# Patient Record
Sex: FEMALE | Race: WHITE | NOT HISPANIC OR LATINO | Employment: UNEMPLOYED | URBAN - METROPOLITAN AREA
[De-identification: names, ages, dates, MRNs, and addresses within clinical notes are randomized per-mention and may not be internally consistent; named-entity substitution may affect disease eponyms.]

---

## 2019-04-03 ENCOUNTER — OFFICE VISIT (OUTPATIENT)
Dept: OBSTETRICS AND GYNECOLOGY | Age: 44
End: 2019-04-03

## 2019-04-03 ENCOUNTER — TELEPHONE (OUTPATIENT)
Dept: OBSTETRICS AND GYNECOLOGY | Age: 44
End: 2019-04-03

## 2019-04-03 ENCOUNTER — PROCEDURE VISIT (OUTPATIENT)
Dept: OBSTETRICS AND GYNECOLOGY | Age: 44
End: 2019-04-03

## 2019-04-03 VITALS
WEIGHT: 191 LBS | SYSTOLIC BLOOD PRESSURE: 108 MMHG | DIASTOLIC BLOOD PRESSURE: 68 MMHG | BODY MASS INDEX: 33.84 KG/M2 | HEIGHT: 63 IN

## 2019-04-03 DIAGNOSIS — Z01.419 WELL FEMALE EXAM WITH ROUTINE GYNECOLOGICAL EXAM: Primary | ICD-10-CM

## 2019-04-03 DIAGNOSIS — E88.81 INSULIN RESISTANCE SYNDROME: ICD-10-CM

## 2019-04-03 DIAGNOSIS — Z11.3 SCREEN FOR STD (SEXUALLY TRANSMITTED DISEASE): ICD-10-CM

## 2019-04-03 DIAGNOSIS — Z13.0 SCREENING, ANEMIA, DEFICIENCY, IRON: ICD-10-CM

## 2019-04-03 DIAGNOSIS — E78.5 DYSLIPIDEMIA: ICD-10-CM

## 2019-04-03 DIAGNOSIS — N93.9 ABNORMAL UTERINE BLEEDING (AUB): Primary | ICD-10-CM

## 2019-04-03 DIAGNOSIS — Z13.89 SCREENING FOR BLOOD OR PROTEIN IN URINE: ICD-10-CM

## 2019-04-03 DIAGNOSIS — Z12.4 PAP SMEAR FOR CERVICAL CANCER SCREENING: ICD-10-CM

## 2019-04-03 DIAGNOSIS — K58.1 IRRITABLE BOWEL SYNDROME WITH CONSTIPATION: ICD-10-CM

## 2019-04-03 DIAGNOSIS — N93.9 ABNORMAL UTERINE BLEEDING (AUB): ICD-10-CM

## 2019-04-03 PROBLEM — E88.810 INSULIN RESISTANCE SYNDROME: Status: ACTIVE | Noted: 2019-04-03

## 2019-04-03 PROCEDURE — 76830 TRANSVAGINAL US NON-OB: CPT | Performed by: OBSTETRICS & GYNECOLOGY

## 2019-04-03 PROCEDURE — 99213 OFFICE O/P EST LOW 20 MIN: CPT | Performed by: OBSTETRICS & GYNECOLOGY

## 2019-04-03 PROCEDURE — 99386 PREV VISIT NEW AGE 40-64: CPT | Performed by: OBSTETRICS & GYNECOLOGY

## 2019-04-03 RX ORDER — DEXLANSOPRAZOLE 60 MG/1
60 CAPSULE, DELAYED RELEASE ORAL DAILY
COMMUNITY
Start: 2019-03-20 | End: 2020-12-21

## 2019-04-03 RX ORDER — PLECANATIDE 3 MG/1
TABLET ORAL DAILY
COMMUNITY
Start: 2019-03-20 | End: 2019-07-22

## 2019-04-03 RX ORDER — LEVONORGESTREL AND ETHINYL ESTRADIOL 0.1-0.02MG
1 KIT ORAL DAILY
Qty: 28 TABLET | Refills: 12 | Status: SHIPPED | OUTPATIENT
Start: 2019-04-03 | End: 2020-03-09

## 2019-04-03 NOTE — TELEPHONE ENCOUNTER
Received a rejection for patient's Balcoltra.  Called [facility] to attempt PA and was told this was a plan exclusion and given a list of formulary alternatives:     Aubra-Eq; Falmina; Larissia; Orsythia; Aviane; Delyla; Lessina; Levonor; Lutera; Sronyx; Viena.     The prescribed medication is up for review, however, they do not fill it will be approved.

## 2019-04-03 NOTE — PROGRESS NOTES
Routine Annual Visit    4/3/2019    Patient: Nelda Gaspar          MR#:[id number]      History of Present Illness    Chief Complaint   Patient presents with   • Establish Care     New patient.  AUB. Takes Sprintec around the clock and still having cycles.    • Gynecologic Exam     Annual.  Last pap 2 years ago and mammogram (S) approximately 2 years ago, both normal.        43 y.o. female  who presents for annual exam.    Presents for routine annual exam reporting abnormal uterine bleeding for the past 6 months.  She takes her oral contraceptive pills continuously and has done well on the pill previously but in the last 6 months she has had several episodes of prolonged bleeding with the most recent episode lasting approximately 3 weeks last month.  The patient reports no bleeding currently.  The patient also has irritable bowel type symptoms with predominant constipation on multiple medications to treat chronic constipation.  She reports continued intermittent moderate constipation even on the current regimen she is taking.     Patient's last menstrual period was 2019 (approximate).  Obstetric History:  OB History      Para Term  AB Living    0 0 0 0 0 0    SAB TAB Ectopic Molar Multiple Live Births    0 0 0 0 0 0         Menstrual History:     Patient's last menstrual period was 2019 (approximate).       Sexual History:       ________________________________________  Patient Active Problem List   Diagnosis   • Insulin resistance syndrome   • Irritable bowel syndrome with constipation   • Dyslipidemia   • Well female exam with routine gynecological exam   • Abnormal uterine bleeding (AUB)       Past Medical History:   Diagnosis Date   • Anxiety    • Asthma    • DDD (degenerative disc disease), lumbar    • Dyslipidemia    • GERD (gastroesophageal reflux disease)    • IBS (irritable bowel syndrome)    • Multiple allergies    • PCOS (polycystic ovarian syndrome)        Past  Surgical History:   Procedure Laterality Date   • WISDOM TOOTH EXTRACTION         Social History     Tobacco Use   Smoking Status Never Smoker   Smokeless Tobacco Never Used       Family History   Problem Relation Age of Onset   • Diabetes Mother    • Osteoporosis Mother    • Other Mother         hysterectomy in her 40's.  Patient is 74 and still has PMB.    • Heart disease Father         Stent placement   • Diabetes Father    • Other Father         blood clots   • Breast cancer Paternal Grandmother         50's       Prior to Admission medications    Medication Sig Start Date End Date Taking? Authorizing Provider   albuterol sulfate HFA (PROAIR HFA) 108 (90 Base) MCG/ACT inhaler  7/12/14  Yes Grey, Nurse Keira RN   buPROPion SR (WELLBUTRIN SR) 150 MG 12 hr tablet Take 150 mg by mouth 2 (two) times a day.   Yes Grey, Nurse Epic, RN   cyclobenzaprine (FLEXERIL) 10 MG tablet  3/8/19  Yes Grey, Nurse Keira RN   DEXILANT 60 MG capsule Take 60 mg by mouth Daily. 3/20/19  Yes Provider, MD Rosendo   fenofibrate 160 MG tablet Take 160 mg by mouth daily.   Yes Grey, Nurse Keira RN   metFORMIN (GLUCOPHAGE) 500 MG tablet Take 500 mg by mouth 3 (Three) Times a Day With Meals.   Yes Emergency, Nurse Keira RN   NON FORMULARY Steroid injections to hip/back every 6 weeks.  (epidural injections in each hip)   Yes Grey, Nurse Keira RN   norgestimate-ethinyl estradiol (SPRINTEC 28) 0.25-35 MG-MCG per tablet Take 1 tablet by mouth. 9/5/14  Yes Nurse Keira Edmonds RN   pravastatin (PRAVACHOL) 20 MG tablet Take 20 mg by mouth daily.   Yes Grey, Nurse Epic, RN   traZODone (DESYREL) 50 MG tablet Take 50 mg by mouth Every Night.   Yes Emergency, Nurse Keira RN   TRULANCE 3 MG tablet Daily. 3/20/19  Yes Provider, Historical, MD   AMITIZA 24 MCG capsule  2/14/19 4/3/19  Nurse Keira Edmonds RN   amoxicillin-clavulanate (AUGMENTIN) 875-125 MG per tablet Take 1 tablet by mouth 2 (Two) Times a Day. 3/10/19  "4/3/19  Angel Acevedo Jr., DO   citalopram (CeleXA) 20 MG tablet Take 20 mg by mouth daily.  4/3/19  Emergency, Nurse Epic, RN   fluticasone (FLONASE) 50 MCG/ACT nasal spray 2 sprays into each nostril daily. Administer 2 sprays in each nostril for each dose.  4/3/19  Emergency, Nurse Epic, RN   hyoscyamine (LEVBID) 0.375 MG 12 hr tablet  12/30/18 4/3/19  Emergency, Nurse Keira RN   promethazine (PHENERGAN) 25 MG tablet  12/4/18 4/3/19  Emergency, Nurse NEAL Crockett     ________________________________________    Current contraception: OCP (estrogen/progesterone)  History of abnormal Pap smear: no  Family history of uterine or ovarian cancer: no  Family History of colon cancer/colon polyps: no  History of abnormal mammogram: no  History of abnormal lipids: yes - treated    The following portions of the patient's history were reviewed and updated as appropriate: allergies, current medications, past family history, past medical history, past social history, past surgical history and problem list.    Review of Systems    Pertinent items are noted in HPI.     Objective   Physical Exam    /68   Ht 160 cm (63\")   Wt 86.6 kg (191 lb)   LMP 03/03/2019 (Approximate)   Breastfeeding? No   BMI 33.83 kg/m²    BP Readings from Last 3 Encounters:   04/03/19 108/68   03/10/19 99/68   02/14/18 109/79      Wt Readings from Last 3 Encounters:   04/03/19 86.6 kg (191 lb)   03/10/19 83.5 kg (184 lb)   02/14/18 88.5 kg (195 lb)        BMI: Estimated body mass index is 33.83 kg/m² as calculated from the following:    Height as of this encounter: 160 cm (63\").    Weight as of this encounter: 86.6 kg (191 lb).       General: alert, appears stated age and cooperative   Heart: regular rate and rhythm, S1, S2 normal, no murmur, click, rub or gallop   Lungs: clear to auscultation bilaterally   Abdomen: soft, non-tender, without masses, no organomegaly   Breast: inspection negative, no nipple discharge or bleeding, no masses or " nodularity palpable   Vulva: normal and bartholin's, Urethra, Gay's normal   Vagina: normal mucosa, normal discharge   Cervix: no lesions   Uterus: normal size or exam is limited habitus    Adnexa: exam limited by habitus     As part of wellness and prevention, the following topics were discussed with the patient:     []  Nutrition  [x]  Physical activity/regular exercise   []  Healthy weight  []  Injury prevention  []  Smoking cessation  []  Substance misuse/abuse  []  Sexual behavior  []  STD prevention  []  Contaception  []  Dental health  []  Mental health  []  Immunization  [x]  Encouraged SBE        Assessment:    Nelda was seen today for establish care and gynecologic exam.    Diagnoses and all orders for this visit:    Well female exam with routine gynecological exam  -     IgP, Aptima HPV    Screening for blood or protein in urine  -     Cancel: POC Urinalysis Dipstick    Pap smear for cervical cancer screening  -     IgP, Aptima HPV    Insulin resistance syndrome  -     Hemoglobin A1c    Irritable bowel syndrome with constipation  -suggest MOM    Dyslipidemia    Abnormal uterine bleeding (AUB)  -     TSH  -     Luteinizing hormone  -     Follicle stimulating hormone  -     Prolactin  -     T4, free  - GYN US    Screening, anemia, deficiency, iron  -     CBC (No Diff)          Plan:  No Follow-up on file.      Virgilio Orozco MD  4/3/2019 11:38 AM

## 2019-04-04 LAB
ERYTHROCYTE [DISTWIDTH] IN BLOOD BY AUTOMATED COUNT: 13.8 % (ref 12.3–15.4)
FSH SERPL-ACNC: 11 MIU/ML
HBA1C MFR BLD: 5 % (ref 4.8–5.6)
HCT VFR BLD AUTO: 37.5 % (ref 34–46.6)
HGB BLD-MCNC: 11.4 G/DL (ref 12–15.9)
LH SERPL-ACNC: 13.2 MIU/ML
MCH RBC QN AUTO: 27.3 PG (ref 26.6–33)
MCHC RBC AUTO-ENTMCNC: 30.4 G/DL (ref 31.5–35.7)
MCV RBC AUTO: 89.9 FL (ref 79–97)
PLATELET # BLD AUTO: 496 10*3/MM3 (ref 140–450)
PROLACTIN SERPL-MCNC: 8.2 NG/ML (ref 4.8–23.3)
RBC # BLD AUTO: 4.17 10*6/MM3 (ref 3.77–5.28)
T4 FREE SERPL-MCNC: 1.18 NG/DL (ref 0.93–1.7)
TSH SERPL DL<=0.005 MIU/L-ACNC: 2.25 MIU/ML (ref 0.27–4.2)
WBC # BLD AUTO: 5.65 10*3/MM3 (ref 3.4–10.8)

## 2019-04-05 ENCOUNTER — TELEPHONE (OUTPATIENT)
Dept: OBSTETRICS AND GYNECOLOGY | Age: 44
End: 2019-04-05

## 2019-04-05 LAB
C TRACH RRNA SPEC QL NAA+PROBE: NEGATIVE
N GONORRHOEA RRNA SPEC QL NAA+PROBE: NEGATIVE
T VAGINALIS RRNA VAG QL NAA+PROBE: NEGATIVE

## 2019-04-05 NOTE — TELEPHONE ENCOUNTER
----- Message from Virgilio Orozco MD sent at 4/4/2019  7:48 AM EDT -----  Call pt:  Hormonal profile for abnormal bleeding is normal

## 2019-04-06 LAB
CYTOLOGIST CVX/VAG CYTO: NORMAL
CYTOLOGY CVX/VAG DOC THIN PREP: NORMAL
DX ICD CODE: NORMAL
HIV 1 & 2 AB SER-IMP: NORMAL
HPV I/H RISK 4 DNA CVX QL PROBE+SIG AMP: NEGATIVE
OTHER STN SPEC: NORMAL
PATH REPORT.FINAL DX SPEC: NORMAL
STAT OF ADQ CVX/VAG CYTO-IMP: NORMAL

## 2019-04-08 ENCOUNTER — TELEPHONE (OUTPATIENT)
Dept: OBSTETRICS AND GYNECOLOGY | Age: 44
End: 2019-04-08

## 2019-04-08 NOTE — TELEPHONE ENCOUNTER
----- Message from Virgilio Orozco MD sent at 4/7/2019  7:29 AM EDT -----  Call pt:  PAP is negative.

## 2019-05-07 ENCOUNTER — TELEPHONE (OUTPATIENT)
Dept: OBSTETRICS AND GYNECOLOGY | Age: 44
End: 2019-05-07

## 2019-05-07 NOTE — TELEPHONE ENCOUNTER
The patient was in for preventive exam with a problem.    The Abnormal uterine bleeding is an issue outside of the annual that required further testing    It would be highly inappropriate to code it otherwise     You might compare it to dental work where you went for a cleaning and got a root canal.

## 2019-05-07 NOTE — TELEPHONE ENCOUNTER
Patient's  phoned and spoke with Chantal.  Stated they are receiving a bill from [facility] for $23.00 for the labs that were done for AUB. (hormonal profile).   stated if we code as preventative insurance will pay at 100%.  Patient was a new patient and seen for annual visit, however, she had the issue of AUB.  Can we had preventative codes to her bill??

## 2020-03-09 RX ORDER — LEVONORGESTREL AND ETHINYL ESTRADIOL 0.1-0.02MG
KIT ORAL
Qty: 28 TABLET | Refills: 3 | Status: SHIPPED | OUTPATIENT
Start: 2020-03-09 | End: 2020-06-18

## 2020-06-18 RX ORDER — LEVONORGESTREL AND ETHINYL ESTRADIOL 0.1-0.02MG
KIT ORAL
Qty: 28 TABLET | Refills: 2 | Status: SHIPPED | OUTPATIENT
Start: 2020-06-18 | End: 2020-09-22

## 2020-06-25 ENCOUNTER — OFFICE VISIT (OUTPATIENT)
Dept: OBSTETRICS AND GYNECOLOGY | Age: 45
End: 2020-06-25

## 2020-06-25 ENCOUNTER — TELEPHONE (OUTPATIENT)
Dept: OBSTETRICS AND GYNECOLOGY | Age: 45
End: 2020-06-25

## 2020-06-25 VITALS
WEIGHT: 205 LBS | HEIGHT: 63 IN | DIASTOLIC BLOOD PRESSURE: 76 MMHG | SYSTOLIC BLOOD PRESSURE: 120 MMHG | BODY MASS INDEX: 36.32 KG/M2

## 2020-06-25 DIAGNOSIS — Z12.31 SCREENING MAMMOGRAM, ENCOUNTER FOR: ICD-10-CM

## 2020-06-25 DIAGNOSIS — Z13.89 SCREENING FOR BLOOD OR PROTEIN IN URINE: ICD-10-CM

## 2020-06-25 DIAGNOSIS — Z01.419 WELL FEMALE EXAM WITH ROUTINE GYNECOLOGICAL EXAM: Primary | ICD-10-CM

## 2020-06-25 DIAGNOSIS — Z01.419 PAP TEST, AS PART OF ROUTINE GYNECOLOGICAL EXAMINATION: ICD-10-CM

## 2020-06-25 LAB
BILIRUB BLD-MCNC: NEGATIVE MG/DL
CLARITY, POC: CLEAR
COLOR UR: YELLOW
GLUCOSE UR STRIP-MCNC: NEGATIVE MG/DL
KETONES UR QL: NEGATIVE
LEUKOCYTE EST, POC: ABNORMAL
NITRITE UR-MCNC: NEGATIVE MG/ML
PH UR: 5.5 [PH] (ref 5–8)
PROT UR STRIP-MCNC: NEGATIVE MG/DL
RBC # UR STRIP: NEGATIVE /UL
SP GR UR: 1.01 (ref 1–1.03)
UROBILINOGEN UR QL: NORMAL

## 2020-06-25 PROCEDURE — 81002 URINALYSIS NONAUTO W/O SCOPE: CPT | Performed by: OBSTETRICS & GYNECOLOGY

## 2020-06-25 PROCEDURE — 99396 PREV VISIT EST AGE 40-64: CPT | Performed by: OBSTETRICS & GYNECOLOGY

## 2020-06-25 NOTE — PROGRESS NOTES
Routine Annual Visit    2020    Patient: Nelda Gaspar          MR#:[id number]      History of Present Illness    Chief Complaint   Patient presents with   • Gynecologic Exam     Annual.Pap 4/3/19 negative.        45 y.o. female  who presents for annual exam.    The patient presents for routine annual exam feeling well without any complaints    Patient's last menstrual period was 2020.  Obstetric History:  OB History        0    Para   0    Term   0       0    AB   0    Living   0       SAB   0    TAB   0    Ectopic   0    Molar   0    Multiple   0    Live Births   0               Menstrual History:     Patient's last menstrual period was 2020.       Sexual History:       ________________________________________  Patient Active Problem List   Diagnosis   • Insulin resistance syndrome   • Irritable bowel syndrome with constipation   • Dyslipidemia   • Well female exam with routine gynecological exam   • Abnormal uterine bleeding (AUB)       Past Medical History:   Diagnosis Date   • Anxiety    • Asthma    • DDD (degenerative disc disease), lumbar    • Dyslipidemia    • GERD (gastroesophageal reflux disease)    • IBS (irritable bowel syndrome)    • Multiple allergies    • PCOS (polycystic ovarian syndrome)        Past Surgical History:   Procedure Laterality Date   • WISDOM TOOTH EXTRACTION         Social History     Tobacco Use   Smoking Status Never Smoker   Smokeless Tobacco Never Used       Family History   Problem Relation Age of Onset   • Diabetes Mother    • Osteoporosis Mother    • Other Mother         hysterectomy in her 40's.  Patient is 74 and still has PMB.    • Heart disease Father         Stent placement   • Diabetes Father    • Other Father         blood clots   • Breast cancer Paternal Grandmother         50's       Prior to Admission medications    Medication Sig Start Date End Date Taking? Authorizing Provider   albuterol sulfate HFA (PROAIR HFA) 108 (90  "Base) MCG/ACT inhaler  7/12/14  Yes Emergency, Nurse NEAL Crockett   buPROPion SR (WELLBUTRIN SR) 150 MG 12 hr tablet Take 150 mg by mouth 2 (two) times a day.   Yes Emergency, Nurse Epic, RN   cyclobenzaprine (FLEXERIL) 10 MG tablet  3/8/19  Yes Emergency, Nurse NEAL Crockett   DEXILANT 60 MG capsule Take 60 mg by mouth Daily. 3/20/19  Yes Provider, MD Rosendo   fenofibrate 160 MG tablet Take 160 mg by mouth daily.   Yes Emergency, Nurse NEAL Crockett   metFORMIN (GLUCOPHAGE) 500 MG tablet Take 500 mg by mouth 3 (Three) Times a Day With Meals.   Yes Emergency, Nurse NEAL Crockett   NON FORMULARY Steroid injections to hip/back every 6 weeks.  (epidural injections in each hip)   Yes Emergency, Nurse NEAL Crockett   pravastatin (PRAVACHOL) 20 MG tablet Take 20 mg by mouth daily.   Yes Emergency, Nurse Epic, RN   traZODone (DESYREL) 50 MG tablet Take 50 mg by mouth Every Night.   Yes Emergency, Nurse NEAL Crockett   VIENVA 0.1-20 MG-MCG per tablet TAKE ONE TABLET BY MOUTH DAILY 6/18/20  Yes Virgilio Orozco MD   loratadine (CLARITIN) 10 MG tablet Take 1 tablet by mouth Daily. 7/22/19 6/25/20  Christi Dodge PA   sulfamethoxazole-trimethoprim (BACTRIM DS,SEPTRA DS) 800-160 MG per tablet Take 1 tablet by mouth 2 (Two) Times a Day. 7/22/19 6/25/20  Christi Dodge PA     ________________________________________    Current contraception: OCP (estrogen/progesterone)  History of abnormal Pap smear: no  Family history of uterine or ovarian cancer: no  Family History of colon cancer/colon polyps: no  History of abnormal mammogram: no  History of abnormal lipids: no    The following portions of the patient's history were reviewed and updated as appropriate: allergies, current medications, past family history, past medical history, past social history, past surgical history and problem list.    Review of Systems    Pertinent items are noted in HPI.     Objective   Physical Exam    /76   Ht 160 cm (63\")   Wt 93 kg (205 lb)   LMP 05/01/2020   BMI " "36.31 kg/m²    BP Readings from Last 3 Encounters:   06/25/20 120/76   07/22/19 127/86   04/03/19 108/68      Wt Readings from Last 3 Encounters:   06/25/20 93 kg (205 lb)   07/22/19 87.5 kg (193 lb)   04/03/19 86.6 kg (191 lb)        BMI: Estimated body mass index is 36.31 kg/m² as calculated from the following:    Height as of this encounter: 160 cm (63\").    Weight as of this encounter: 93 kg (205 lb).       General: alert, appears stated age and cooperative   Heart: regular rate and rhythm, S1, S2 normal, no murmur, click, rub or gallop   Lungs: clear to auscultation bilaterally   Abdomen: soft, non-tender, without masses, no organomegaly   Breast: inspection negative, no nipple discharge or bleeding, no masses or nodularity palpable   Vulva: normal and bartholin's, Urethra, [place]'s normal   Vagina: normal mucosa, normal discharge   Cervix: no lesions   Uterus: normal size or exam is limited habitus    Adnexa: exam limited by habitus     As part of wellness and prevention, the following topics were discussed with the patient:  Encouraged self breast exam  Physical activity and regular exercised encouraged.     Assessment:    Nelda was seen today for gynecologic exam.    Diagnoses and all orders for this visit:    Well female exam with routine gynecological exam  -     IgP, Aptima HPV    Screening for blood or protein in urine  -     POC Urinalysis Dipstick    Screening mammogram, encounter for  -     Mammo Screening Bilateral With CAD; Future    Pap test, as part of routine gynecological examination  -     IgP, Aptima HPV      Previous mammogram report completed at [facility] requested      Plan:  Return in about 1 year (around 6/25/2021) for Annual GYN exam.      Virgilio Orozco MD  6/25/2020 15:39  "

## 2020-07-03 LAB
CYTOLOGIST CVX/VAG CYTO: NORMAL
CYTOLOGY CVX/VAG DOC CYTO: NORMAL
CYTOLOGY CVX/VAG DOC THIN PREP: NORMAL
DX ICD CODE: NORMAL
HIV 1 & 2 AB SER-IMP: NORMAL
HPV I/H RISK 4 DNA CVX QL PROBE+SIG AMP: NEGATIVE
OTHER STN SPEC: NORMAL
STAT OF ADQ CVX/VAG CYTO-IMP: NORMAL

## 2020-07-06 ENCOUNTER — TELEPHONE (OUTPATIENT)
Dept: OBSTETRICS AND GYNECOLOGY | Age: 45
End: 2020-07-06

## 2020-07-06 NOTE — TELEPHONE ENCOUNTER
----- Message from Virgilio Orozco MD sent at 7/3/2020 12:37 PM EDT -----  Call pt:  PAP is negative.

## 2020-07-27 ENCOUNTER — HOSPITAL ENCOUNTER (OUTPATIENT)
Dept: MAMMOGRAPHY | Facility: HOSPITAL | Age: 45
Discharge: HOME | End: 2020-07-27
Admitting: OBSTETRICS & GYNECOLOGY

## 2020-07-27 DIAGNOSIS — Z12.31 SCREENING MAMMOGRAM, ENCOUNTER FOR: ICD-10-CM

## 2020-07-27 PROCEDURE — 77067 SCR MAMMO BI INCL CAD: CPT

## 2020-07-28 ENCOUNTER — TELEPHONE (OUTPATIENT)
Dept: OBSTETRICS AND GYNECOLOGY | Age: 45
End: 2020-07-28

## 2020-07-28 NOTE — TELEPHONE ENCOUNTER
----- Message from Virgilio Orozco MD sent at 7/27/2020  6:15 PM EDT -----  Call patient: Normal mammogram

## 2020-09-22 RX ORDER — LEVONORGESTREL AND ETHINYL ESTRADIOL 0.1-0.02MG
KIT ORAL
Qty: 90 TABLET | Refills: 3 | Status: SHIPPED | OUTPATIENT
Start: 2020-09-22 | End: 2020-12-21

## 2021-03-23 ENCOUNTER — BULK ORDERING (OUTPATIENT)
Dept: CASE MANAGEMENT | Facility: OTHER | Age: 46
End: 2021-03-23

## 2021-03-23 DIAGNOSIS — Z23 IMMUNIZATION DUE: ICD-10-CM

## 2021-12-29 ENCOUNTER — OFFICE VISIT (OUTPATIENT)
Dept: OBSTETRICS AND GYNECOLOGY | Age: 46
End: 2021-12-29

## 2021-12-29 VITALS
DIASTOLIC BLOOD PRESSURE: 70 MMHG | SYSTOLIC BLOOD PRESSURE: 110 MMHG | HEIGHT: 63 IN | WEIGHT: 200 LBS | BODY MASS INDEX: 35.44 KG/M2

## 2021-12-29 DIAGNOSIS — Z13.89 SCREENING FOR BLOOD OR PROTEIN IN URINE: ICD-10-CM

## 2021-12-29 DIAGNOSIS — Z12.4 SCREENING FOR MALIGNANT NEOPLASM OF CERVIX: ICD-10-CM

## 2021-12-29 DIAGNOSIS — Z12.31 SCREENING MAMMOGRAM, ENCOUNTER FOR: ICD-10-CM

## 2021-12-29 DIAGNOSIS — N95.1 MENOPAUSAL SYMPTOMS: ICD-10-CM

## 2021-12-29 DIAGNOSIS — Z01.419 WELL FEMALE EXAM WITH ROUTINE GYNECOLOGICAL EXAM: Primary | ICD-10-CM

## 2021-12-29 DIAGNOSIS — Z11.51 SCREENING FOR HUMAN PAPILLOMAVIRUS (HPV): ICD-10-CM

## 2021-12-29 PROBLEM — K29.70 GASTRITIS: Status: RESOLVED | Noted: 2018-10-03 | Resolved: 2021-12-29

## 2021-12-29 PROBLEM — K29.70 GASTRITIS: Status: ACTIVE | Noted: 2018-10-03

## 2021-12-29 PROBLEM — E88.81 METABOLIC SYNDROME X: Status: ACTIVE | Noted: 2019-04-03

## 2021-12-29 PROBLEM — M50.30 DDD (DEGENERATIVE DISC DISEASE), CERVICAL: Status: ACTIVE | Noted: 2018-06-13

## 2021-12-29 PROBLEM — K22.70 BARRETT'S ESOPHAGUS: Status: ACTIVE | Noted: 2018-10-03

## 2021-12-29 PROBLEM — E88.810 METABOLIC SYNDROME X: Status: ACTIVE | Noted: 2019-04-03

## 2021-12-29 PROBLEM — E78.00 HYPERCHOLESTEROLEMIA: Status: ACTIVE | Noted: 2021-01-25

## 2021-12-29 LAB
BILIRUB BLD-MCNC: NEGATIVE MG/DL
CLARITY, POC: CLEAR
COLOR UR: YELLOW
GLUCOSE UR STRIP-MCNC: NEGATIVE MG/DL
KETONES UR QL: NEGATIVE
LEUKOCYTE EST, POC: NEGATIVE
NITRITE UR-MCNC: NEGATIVE MG/ML
PH UR: 6 [PH] (ref 5–8)
PROT UR STRIP-MCNC: NEGATIVE MG/DL
RBC # UR STRIP: NEGATIVE /UL
SP GR UR: 1.01 (ref 1–1.03)
UROBILINOGEN UR QL: NORMAL

## 2021-12-29 PROCEDURE — 81002 URINALYSIS NONAUTO W/O SCOPE: CPT | Performed by: OBSTETRICS & GYNECOLOGY

## 2021-12-29 PROCEDURE — 99396 PREV VISIT EST AGE 40-64: CPT | Performed by: OBSTETRICS & GYNECOLOGY

## 2021-12-29 PROCEDURE — 99212 OFFICE O/P EST SF 10 MIN: CPT | Performed by: OBSTETRICS & GYNECOLOGY

## 2021-12-29 RX ORDER — POLYETHYLENE GLYCOL 3350 17 G/17G
17 POWDER, FOR SOLUTION ORAL DAILY
COMMUNITY

## 2021-12-29 RX ORDER — PHENOL 1.4 %
AEROSOL, SPRAY (ML) MUCOUS MEMBRANE
COMMUNITY

## 2021-12-29 RX ORDER — SENNA PLUS 8.6 MG/1
1 TABLET ORAL DAILY
COMMUNITY

## 2021-12-29 RX ORDER — FAMOTIDINE 20 MG/1
TABLET, FILM COATED ORAL
COMMUNITY

## 2021-12-29 RX ORDER — LIDOCAINE 36 MG/1
PATCH TOPICAL
COMMUNITY

## 2021-12-29 RX ORDER — LUBIPROSTONE 24 UG/1
24 CAPSULE ORAL
COMMUNITY
Start: 2021-11-29 | End: 2022-11-29

## 2021-12-29 NOTE — PROGRESS NOTES
[facility]   Obstetrics and Gynecology       2021    Patient: Nelda Gaspar          MR#:[id number]    History of Present Illness    Chief Complaint   Patient presents with   • Gynecologic Exam     last pap 2020 neg, last mg 2020   • Menopause     pt believes shemay be starting menopause due to bad night swwetas. She reports starting Estroven OTC and it has helped       46 y.o. female  who presents for annual exam.    The patient presents for her regular exam feeling well without complaints.  Problems with constipation are markedly improved by her current regimen.  The patient reports regular periods that are exceptionally short but increased symptoms of hot flashes and night sweats.        Studies reviewed:    Patient's last menstrual period was 2021 (within days).  Obstetric History:  OB History        0    Para   0    Term   0       0    AB   0    Living   0       SAB   0    IAB   0    Ectopic   0    Molar   0    Multiple   0    Live Births   0               Menstrual History:     Patient's last menstrual period was 2021 (within days).       Sexual History:       ________________________________________  Patient Active Problem List   Diagnosis   • Insulin resistance syndrome   • Irritable bowel syndrome with constipation   • Dyslipidemia   • Well female exam with routine gynecological exam   • Abnormal uterine bleeding (AUB)   • Lynne's esophagus   • DDD (degenerative disc disease), cervical   • Metabolic syndrome X   • Hypercholesterolemia   • Hyperlipidemia     Past Medical History:   Diagnosis Date   • Anxiety    • Asthma    • DDD (degenerative disc disease), lumbar    • Dyslipidemia    • GERD (gastroesophageal reflux disease)    • IBS (irritable bowel syndrome)    • Multiple allergies    • PCOS (polycystic ovarian syndrome)      Past Surgical History:   Procedure Laterality Date   • WISDOM TOOTH EXTRACTION       Social History     Tobacco Use    Smoking Status Never Smoker   Smokeless Tobacco Never Used     Family History   Problem Relation Age of Onset   • Diabetes Mother    • Osteoporosis Mother    • Other Mother         hysterectomy in her 40's.  Patient is 74 and still has PMB.    • Heart disease Father         Stent placement   • Diabetes Father    • Other Father         blood clots   • Breast cancer Paternal Grandmother         50's     Prior to Admission medications    Medication Sig Start Date End Date Taking? Authorizing Provider   buPROPion SR (WELLBUTRIN SR) 150 MG 12 hr tablet Take 150 mg by mouth 2 (two) times a day.   Yes Emergency, Nurse Keira RN   celecoxib (CeleBREX) 100 MG capsule Celebrex 100 mg capsule   Yes Emergency, Nurse NEAL Crockett   citalopram (CeleXA) 40 MG tablet citalopram 40 mg tablet   Yes Emergency, Nurse Epic, RN   cyclobenzaprine (FLEXERIL) 10 MG tablet  3/8/19  Yes Grey, Nurse Keira RN   famotidine (PEPCID) 20 MG tablet Take  by mouth.   Yes ProviderRosendo MD   fenofibrate 160 MG tablet Take 160 mg by mouth daily.   Yes Grey, Nurse Keira RN   Lidocaine (ZTlido) 1.8 % patch ZTlido 1.8 % topical patch   APPLY 1-3 PATCHES TO AREA TOPICALLY AS DIRECTED FOR UP TO 12 HOURS PER DAY   Yes ProviderRosendo MD   lubiprostone (AMITIZA) 24 MCG capsule Take 24 mcg by mouth. 11/29/21 11/29/22 Yes ProviderRosendo MD   Melatonin 10 MG tablet Take  by mouth.   Yes ProviderRosendo MD   metFORMIN (GLUCOPHAGE) 500 MG tablet Take 500 mg by mouth 3 (Three) Times a Day With Meals.   Yes Nurse Keira Edmonds RN   NON FORMULARY Steroid injections to hip/back every 6 weeks.  (epidural injections in each hip)   Yes Grey, Nurse Epic, RN   polyethylene glycol (MIRALAX) 17 GM/SCOOP powder Take 17 g by mouth Daily.   Yes ProviderRosendo MD   pravastatin (PRAVACHOL) 20 MG tablet Take 20 mg by mouth daily.   Yes Nurse Keira Edmonds RN   Rhubarb (ESTROVEN COMPLETE PO) Take  by mouth.   Yes Provider  "Historical, MD   senna (SENOKOT) 8.6 MG tablet Take 1 tablet by mouth Daily.   Yes Provider, MD Rosendo   traZODone (DESYREL) 50 MG tablet Take 50 mg by mouth Every Night.   Yes Emergency, Nurse Keira, RN     ________________________________________    Current contraception: condoms  History of abnormal Pap smear: no  Family history of uterine or ovarian cancer: no  Family History of colon cancer/colon polyps: no  History of abnormal mammogram: no  History of abnormal lipids: yes - treated.    The following portions of the patient's history were reviewed and updated as appropriate: allergies, current medications, past family history, past medical history, past social history, past surgical history and problem list.    Review of Systems    Pertinent items are noted in HPI.       Objective   Physical Exam    /70   Ht 160 cm (63\")   Wt 90.7 kg (200 lb)   LMP 12/08/2021 (Within Days)   Breastfeeding No   BMI 35.43 kg/m²    BP Readings from Last 3 Encounters:   12/29/21 110/70   04/13/21 110/78   12/21/20 110/79      Wt Readings from Last 3 Encounters:   12/29/21 90.7 kg (200 lb)   04/13/21 88.5 kg (195 lb)   01/27/21 86.6 kg (191 lb)        BMI: Estimated body mass index is 35.43 kg/m² as calculated from the following:    Height as of this encounter: 160 cm (63\").    Weight as of this encounter: 90.7 kg (200 lb).       General: alert, appears stated age and cooperative   Heart: regular rate and rhythm, S1, S2 normal, no murmur, click, rub or gallop   Lungs: clear to auscultation bilaterally   Abdomen: soft, non-tender, without masses, no organomegaly   Breast: inspection negative, no nipple discharge or bleeding, no masses or nodularity palpable   External genitalia/Vulva: External genitalia including bartholin's glands, Urethra, [place]'s gland and urethra meatus are normal, Perineum, rectum and anus appear normal  and Bladder appears normal without significant prolapse    Vagina: normal mucosa, normal " discharge   Cervix: no lesions   Uterus: normal size, mobile and non-tender   Adnexa: exam limited by habitus     As part of wellness and prevention, the following topics were discussed with the patient:  Encouraged self breast exam  Physical activity and regular exercised encouraged.       Assessment:    Diagnoses and all orders for this visit:    1. Well female exam with routine gynecological exam (Primary)  -     IgP, Aptima HPV    2. Menopausal symptoms  -     Follicle Stimulating Hormone    3. Screening for malignant neoplasm of cervix  -     IgP, Aptima HPV    4. Screening for human papillomavirus (HPV)  -     IgP, Aptima HPV    5. Screening mammogram, encounter for  -     Mammo Screening Digital Tomosynthesis Bilateral With CAD; Future        Plan:  Return in about 1 year (around 12/29/2022) for Annual GYN exam.      Virgilio Orozco MD  12/29/2021 11:14 EST

## 2021-12-30 LAB — FSH SERPL-ACNC: 9 MIU/ML

## 2022-02-07 ENCOUNTER — HOSPITAL ENCOUNTER (OUTPATIENT)
Dept: MAMMOGRAPHY | Facility: HOSPITAL | Age: 47
Discharge: HOME | End: 2022-02-07
Admitting: OBSTETRICS & GYNECOLOGY

## 2022-02-07 DIAGNOSIS — Z12.31 SCREENING MAMMOGRAM, ENCOUNTER FOR: ICD-10-CM

## 2022-02-07 PROCEDURE — 77067 SCR MAMMO BI INCL CAD: CPT

## 2022-02-07 PROCEDURE — 77063 BREAST TOMOSYNTHESIS BI: CPT

## 2023-10-29 NOTE — ADDENDUM NOTE
Addended by: MARGARET PULIDO on: 4/3/2019 11:38 AM     Modules accepted: Orders    
Addended by: PASTORA CHAN on: 4/3/2019 01:05 PM     Modules accepted: Orders    
no

## 2024-03-06 ENCOUNTER — OFFICE VISIT (OUTPATIENT)
Dept: OBSTETRICS AND GYNECOLOGY | Age: 49
End: 2024-03-06
Payer: COMMERCIAL

## 2024-03-06 VITALS
WEIGHT: 196.2 LBS | HEIGHT: 63 IN | BODY MASS INDEX: 34.76 KG/M2 | SYSTOLIC BLOOD PRESSURE: 114 MMHG | DIASTOLIC BLOOD PRESSURE: 72 MMHG

## 2024-03-06 DIAGNOSIS — Z12.4 SCREENING FOR MALIGNANT NEOPLASM OF CERVIX: ICD-10-CM

## 2024-03-06 DIAGNOSIS — Z11.51 SCREENING FOR HUMAN PAPILLOMAVIRUS (HPV): ICD-10-CM

## 2024-03-06 DIAGNOSIS — Z12.31 SCREENING MAMMOGRAM FOR BREAST CANCER: ICD-10-CM

## 2024-03-06 DIAGNOSIS — Z01.419 WELL FEMALE EXAM WITH ROUTINE GYNECOLOGICAL EXAM: Primary | ICD-10-CM

## 2024-03-06 DIAGNOSIS — N95.2 VAGINAL ATROPHY: ICD-10-CM

## 2024-03-06 DIAGNOSIS — R23.2 HOT FLASHES: ICD-10-CM

## 2024-03-06 NOTE — PROGRESS NOTES
Subjective     History of Present Illness    Chief Complaint   Patient presents with    Gynecologic Exam     AE Today, Last pap 2021 (-), HPV (-), MG 10/3/2023, Colonoscopy 2022       Nelda Gaspar is a 48 y.o. female who presents for annual exam.  Menses are regular every 28-30 days, lasting 0-3 days, dysmenorrhea none     Hot flashes and night sweats x 1 year.  Menses have been regular.  Not using contraception. Hx PCOS.   Declines STD testing.   Follows with PCP, completes wellness labs with them. TSH normal in 2023. Recently had a kidney infection, is now taking cranberry supplements.  Her father is on dialysis so she is very cautious of her kidney health.  PAP - normal/negative in , due today  Colonoscopy - normal in , f/u 10 yrs  Mammogram -normal 10/3/2023, patient request orders to [facility]    Obstetric History:  OB History          0    Para   0    Term   0       0    AB   0    Living   0         SAB   0    IAB   0    Ectopic   0    Molar   0    Multiple   0    Live Births   0               Menstrual History:     Patient's last menstrual period was 2024 (approximate).           Current contraception: none  History of abnormal Pap smear: no  Received Gardasil immunization: no  Perform regular self breast exam: yes -    Family history of uterine or ovarian cancer: no  Family History of colon cancer: no  Family history of breast cancer: yes - paternal grandmother, dx 40s    Mammogram: up to date.  Colonoscopy: up to date.  DEXA: not indicated.    Exercise: moderately active - walking and housework  Calcium/Vitamin D: adequate intake and uses supplements    The following portions of the patient's history were reviewed and updated as appropriate: allergies, current medications, past family history, past medical history, past social history, past surgical history, and problem list.    Review of Systems  A comprehensive review of systems was negative except  "for: hot flashes       Objective   Physical Exam    /72   Ht 160 cm (63\")   Wt 89 kg (196 lb 3.2 oz)   LMP 02/05/2024 (Approximate)   BMI 34.76 kg/m²      General: alert, appears stated age, cooperative, and no distress   Heart: regular rate and rhythm, S1, S2 normal, no murmur, click, rub or gallop   Lungs: clear to auscultation bilaterally   Abdomen: soft, non-tender, without masses or organomegaly   Breast: inspection negative, no nipple discharge or bleeding, no masses or nodularity palpable   External genitalia/Vulva: mild atrophy, External genitalia including bartholin's glands, Urethra, Frederika's gland and urethra meatus are normal, and Bladder appears normal without significant prolapse    Vagina: normal mucosa, normal discharge   Cervix: no lesions   Uterus: normal size and non-tender   Adnexa: normal adnexa and no mass, fullness, tenderness   Neurologic: Alert and Oriented x3   Psychiatric: Normal affect, judgement, and mood       Assessment & Plan   Diagnoses and all orders for this visit:    1. Well female exam with routine gynecological exam (Primary)  -     IGP, Apt HPV,rfx 16 / 18,45    2. Screening for human papillomavirus (HPV)  -     IGP, Apt HPV,rfx 16 / 18,45    3. Screening for malignant neoplasm of cervix  -     IGP, Apt HPV,rfx 16 / 18,45    4. Screening mammogram for breast cancer  -     Mammo Screening Digital Tomosynthesis Bilateral With CAD; Future    5. Hot flashes  -     TSH+Free T4  -     FSH & LH    6. Vaginal atrophy          All questions answered.  Pap smear with HPV co-testing done today  Mammogram screening ordered, to [facility] per pt request  Blood tests: Free T4 level, FSH, LH, and TSH.  Will call patient with results and treat accordingly.   Breast self exam technique reviewed and patient encouraged to perform self-exam monthly.  Physical activity and regular exercise encouraged.  Discussed healthy lifestyle modifications.  Discussed calcium and vitamin D needs " to prevent osteoporosis.  Patient had vaginal atrophy on exam, asked patient if she has had any irritation and she reports she has not but has very dry skin.  Recommended Replens vaginal moisturizer.  Discussed with patient if lab values are normal, a low-dose OCP like Lo Lo may give her relief.  Patient declines at this moment.     Return in 1 year (on 3/6/2025) for Annual exam.

## 2024-03-07 LAB
FSH SERPL-ACNC: 8.6 MIU/ML
LH SERPL-ACNC: 7.1 MIU/ML
T4 FREE SERPL-MCNC: 1.47 NG/DL (ref 0.82–1.77)
TSH SERPL DL<=0.005 MIU/L-ACNC: 2.22 UIU/ML (ref 0.45–4.5)

## 2024-03-08 LAB
CYTOLOGIST CVX/VAG CYTO: NORMAL
CYTOLOGY CVX/VAG DOC CYTO: NORMAL
CYTOLOGY CVX/VAG DOC THIN PREP: NORMAL
DX ICD CODE: NORMAL
HPV I/H RISK 4 DNA CVX QL PROBE+SIG AMP: NEGATIVE
Lab: NORMAL
OTHER STN SPEC: NORMAL
STAT OF ADQ CVX/VAG CYTO-IMP: NORMAL

## 2024-10-31 PROBLEM — M53.3 PAIN IN THE COCCYX: Status: ACTIVE | Noted: 2019-06-03

## 2024-10-31 PROBLEM — K59.00 CONSTIPATION: Status: ACTIVE | Noted: 2024-10-31

## 2024-10-31 PROBLEM — M54.9 BACK PAIN: Status: ACTIVE | Noted: 2024-10-31

## 2024-10-31 PROBLEM — F32.A DEPRESSION: Status: ACTIVE | Noted: 2024-10-31

## 2024-10-31 PROBLEM — E16.2 HYPOGLYCEMIA: Status: ACTIVE | Noted: 2024-10-31

## 2024-10-31 PROBLEM — M46.1 BILATERAL SACROILIITIS: Status: ACTIVE | Noted: 2023-07-20

## 2024-10-31 PROBLEM — K21.9 GERD (GASTROESOPHAGEAL REFLUX DISEASE): Status: ACTIVE | Noted: 2024-10-31

## 2024-10-31 PROBLEM — R60.9 EDEMA: Status: ACTIVE | Noted: 2024-10-31

## 2024-10-31 PROBLEM — E88.819 INSULIN RESISTANCE: Status: ACTIVE | Noted: 2024-02-01

## 2024-10-31 PROBLEM — E28.2 PCOS (POLYCYSTIC OVARIAN SYNDROME): Status: ACTIVE | Noted: 2024-10-31

## 2024-10-31 PROBLEM — E66.01 MORBID OBESITY: Status: ACTIVE | Noted: 2024-02-02

## 2024-10-31 PROBLEM — R13.10 DYSPHAGIA: Status: ACTIVE | Noted: 2024-10-31

## 2024-10-31 PROBLEM — E28.2 POLYCYSTIC OVARY SYNDROME: Status: ACTIVE | Noted: 2024-02-01

## 2024-10-31 PROBLEM — K21.9 GASTROESOPHAGEAL REFLUX DISEASE: Status: ACTIVE | Noted: 2024-02-02

## 2024-11-06 ENCOUNTER — OFFICE VISIT (OUTPATIENT)
Dept: GASTROENTEROLOGY | Facility: CLINIC | Age: 49
End: 2024-11-06
Payer: COMMERCIAL

## 2024-11-06 VITALS
HEIGHT: 63 IN | SYSTOLIC BLOOD PRESSURE: 112 MMHG | DIASTOLIC BLOOD PRESSURE: 76 MMHG | BODY MASS INDEX: 30.05 KG/M2 | WEIGHT: 169.6 LBS

## 2024-11-06 DIAGNOSIS — K21.00 GASTROESOPHAGEAL REFLUX DISEASE WITH ESOPHAGITIS WITHOUT HEMORRHAGE: ICD-10-CM

## 2024-11-06 DIAGNOSIS — R10.13 DYSPEPSIA: Primary | ICD-10-CM

## 2024-11-06 DIAGNOSIS — K59.04 CHRONIC IDIOPATHIC CONSTIPATION: ICD-10-CM

## 2024-11-06 PROCEDURE — 99204 OFFICE O/P NEW MOD 45 MIN: CPT | Performed by: INTERNAL MEDICINE

## 2024-11-06 NOTE — H&P (VIEW-ONLY)
"    PATIENT INFORMATION  Nelda Gaspar       - 1975    CHIEF COMPLAINT  Chief Complaint   Patient presents with    Constipation       HISTORY OF PRESENT ILLNESS  [place] [facility] [place] [facility]    Number one issue is her Constipation- teated with BID Amitiza and prn Miralax and Dulcolax    Moving at best twice a week so will add fiber and daily Miralax so will shoot for QOD of possible and PRN dulcolax afte 2 days is fine    Reviewd Ged and GLP-1 and diet recommendations            REVIEWED PERTINENT RESULTS/ LABS  No results found for: \"CASEREPORT\", \"FINALDX\"  Lab Results   Component Value Date    HGB 12.7 2023    MCV 92.4 2023     (H) 2023    ALT 14 2014    AST 16 2014    HGBA1C 5.00 2019      No results found.    REVIEW OF SYSTEMS  Review of Systems   Constitutional:  Negative for activity change, chills, fever and unexpected weight change.   HENT:  Negative for congestion.    Eyes:  Negative for visual disturbance.   Respiratory:  Negative for shortness of breath.    Cardiovascular:  Negative for chest pain and palpitations.   Gastrointestinal:  Positive for abdominal distention, abdominal pain, constipation and nausea. Negative for blood in stool.        Reflux   Endocrine: Negative for cold intolerance and heat intolerance.   Genitourinary:  Negative for hematuria.   Musculoskeletal:  Negative for gait problem.   Skin:  Negative for color change.   Allergic/Immunologic: Negative for immunocompromised state.   Neurological:  Negative for weakness and light-headedness.   Hematological:  Negative for adenopathy.   Psychiatric/Behavioral:  Negative for sleep disturbance. The patient is not nervous/anxious.          ACTIVE PROBLEMS  Patient Active Problem List    Diagnosis     Back pain [M54.9]     Depression [F32.A]     Dysphagia [R13.10]     Edema [R60.9]     Hypoglycemia [E16.2]     GERD (gastroesophageal reflux disease) " [K21.9]     Constipation [K59.00]     PCOS (polycystic ovarian syndrome) [E28.2]     Morbid obesity [E66.01]     Gastroesophageal reflux disease [K21.9]     Insulin resistance [E88.819]     Polycystic ovary syndrome [E28.2]     Bilateral sacroiliitis [M46.1]     Hypercholesterolemia [E78.00]     Pain in the coccyx [M53.3]     Insulin resistance syndrome [E88.810]     Irritable bowel syndrome with constipation [K58.1]     Dyslipidemia [E78.5]     Well female exam with routine gynecological exam [Z01.419]     Abnormal uterine bleeding (AUB) [N93.9]     Metabolic syndrome X [E88.810]     Lynne's esophagus [K22.70]     DDD (degenerative disc disease), cervical [M50.30]     Anxiety disorder [F41.9]     Long-term current use of opiate analgesic [Z79.891]     Arthritis [M19.90]     Hyperlipidemia [E78.5]          PAST MEDICAL HISTORY  Past Medical History:   Diagnosis Date    Anxiety     Asthma     DDD (degenerative disc disease), lumbar     Dyslipidemia     GERD (gastroesophageal reflux disease)     IBS (irritable bowel syndrome)     Multiple allergies     PCOS (polycystic ovarian syndrome)          SURGICAL HISTORY  Past Surgical History:   Procedure Laterality Date    WISDOM TOOTH EXTRACTION           FAMILY HISTORY  Family History   Problem Relation Age of Onset    Diabetes Mother     Osteoporosis Mother     Other Mother         hysterectomy in her 40's.  Patient is 74 and still has PMB.     Heart disease Father         Stent placement    Diabetes Father     Other Father         blood clots    Breast cancer Paternal Grandmother         50's         SOCIAL HISTORY  Social History     Occupational History    Not on file   Tobacco Use    Smoking status: Former     Current packs/day: 0.00     Types: Cigarettes     Quit date:      Years since quittin.8    Smokeless tobacco: Never   Vaping Use    Vaping status: Never Used   Substance and Sexual Activity    Alcohol use: Yes     Alcohol/week: 2.0 standard drinks of  alcohol     Types: 1 Glasses of wine, 1 Shots of liquor per week     Comment: socially    Drug use: No    Sexual activity: Yes     Partners: Male     Birth control/protection: None         CURRENT MEDICATIONS    Current Outpatient Medications:     albuterol sulfate  (90 Base) MCG/ACT inhaler, Inhale 2 puffs Every 4 (Four) Hours As Needed., Disp: , Rfl:     buPROPion SR (WELLBUTRIN SR) 150 MG 12 hr tablet, Take 1 tablet by mouth Daily., Disp: , Rfl:     CBD oil (cannabidiol) capsule, See Admin Instructions., Disp: , Rfl:     celecoxib (CeleBREX) 100 MG capsule, Celebrex 100 mg capsule, Disp: , Rfl:     citalopram (CeleXA) 40 MG tablet, citalopram 40 mg tablet, Disp: , Rfl:     clarithromycin (BIAXIN) 500 MG tablet, , Disp: , Rfl:     cyclobenzaprine (FLEXERIL) 10 MG tablet, , Disp: , Rfl:     diclofenac (VOLTAREN) 75 MG EC tablet, TAKE 1 TABLET BY MOUTH 2 TIMES A DAY WITH FOOD AS DIRECTED, Disp: , Rfl:     famotidine (PEPCID) 40 MG tablet, As Needed., Disp: , Rfl:     fenofibrate 160 MG tablet, Take 1 tablet by mouth Daily., Disp: , Rfl:     fluticasone (Flonase Allergy Relief) 50 MCG/ACT nasal spray, , Disp: , Rfl:     lidocaine (LIDODERM) 5 %, Place 1 patch on the skin as directed by provider., Disp: , Rfl:     lubiprostone (AMITIZA) 24 MCG capsule, Take 1 capsule by mouth., Disp: , Rfl:     Melatonin 10 MG tablet, Take  by mouth., Disp: , Rfl:     metFORMIN (GLUCOPHAGE) 500 MG tablet, Take 1 tablet by mouth 3 (Three) Times a Day With Meals., Disp: , Rfl:     NON FORMULARY, Steroid injections to hip/back every 6 weeks.  (epidural injections in each hip), Disp: , Rfl:     ondansetron ODT (ZOFRAN-ODT) 4 MG disintegrating tablet, Place 1 tablet every 6-8 hours by translingual route., Disp: , Rfl:     Ozempic, 1 MG/DOSE, 4 MG/3ML solution pen-injector, 1 (One) Time Per Week., Disp: , Rfl:     polyethylene glycol (MIRALAX) 17 GM/SCOOP powder, Take 17 g by mouth Daily., Disp: , Rfl:     pravastatin (PRAVACHOL) 20  "MG tablet, Take 1 tablet by mouth Daily., Disp: , Rfl:     promethazine (PHENERGAN) 12.5 MG tablet, , Disp: , Rfl:     senna (SENOKOT) 8.6 MG tablet, Take 1 tablet by mouth Daily., Disp: , Rfl:     traZODone (DESYREL) 50 MG tablet, Take 1 tablet by mouth Every Night., Disp: , Rfl:     ALLERGIES  Bacitra-neomycin-polymyxin-hc, Bacitracin-polymyxin b, and Sulfate    VITALS  Vitals:    11/06/24 1015   BP: 112/76   BP Location: Left arm   Patient Position: Sitting   Cuff Size: Adult   Weight: 76.9 kg (169 lb 9.6 oz)   Height: 160 cm (63\")       PHYSICAL EXAM  Debilities/Disabilities Identified: None  Emotional Behavior: Appropriate  Wt Readings from Last 3 Encounters:   11/06/24 76.9 kg (169 lb 9.6 oz)   10/31/24 77.1 kg (170 lb)   06/23/24 81.6 kg (180 lb)     Ht Readings from Last 1 Encounters:   11/06/24 160 cm (63\")     Body mass index is 30.04 kg/m².  Physical Exam  Constitutional:       Appearance: She is well-developed. She is not diaphoretic.   HENT:      Head: Normocephalic and atraumatic.   Eyes:      General: No scleral icterus.     Conjunctiva/sclera: Conjunctivae normal.      Pupils: Pupils are equal, round, and reactive to light.   Neck:      Thyroid: No thyromegaly.   Cardiovascular:      Rate and Rhythm: Normal rate and regular rhythm.      Heart sounds: Normal heart sounds. No murmur heard.     No gallop.   Pulmonary:      Effort: Pulmonary effort is normal.      Breath sounds: Normal breath sounds. No wheezing or rales.   Abdominal:      General: Bowel sounds are normal. There is no distension or abdominal bruit.      Palpations: Abdomen is soft. There is no shifting dullness, fluid wave or mass.      Tenderness: There is abdominal tenderness in the right upper quadrant, epigastric area and left upper quadrant. There is no guarding. Negative signs include Nava's sign.      Hernia: There is no hernia in the ventral area.   Musculoskeletal:         General: Normal range of motion.      Cervical back: " Normal range of motion and neck supple.   Lymphadenopathy:      Cervical: No cervical adenopathy.   Skin:     General: Skin is warm and dry.      Findings: No erythema or rash.   Neurological:      Mental Status: She is alert and oriented to person, place, and time.   Psychiatric:         Mood and Affect: Mood normal.         Behavior: Behavior normal.         CLINICAL DATA REVIEWED   reviewed previous lab results and integrated with today's visit, reviewed notes from other physicians and/or last GI encounter, reviewed previous endoscopy results and available photos, reviewed surgical pathology results from previous biopsies    ASSESSMENT  Diagnoses and all orders for this visit:    Dyspepsia  -     Case Request; Standing  -     Case Request    Gastroesophageal reflux disease with esophagitis without hemorrhage    Chronic idiopathic constipation    Other orders  -     CBD oil (cannabidiol) capsule; See Admin Instructions.  -     Follow Anesthesia Guidelines / Protocol; Future  -     Obtain Informed Consent; Standing  -     POC Urine Pregnancy; Standing          PLAN  Return in about 3 months (around 2/6/2025).    I have discussed the above plan with the patient.  They verbalize understanding and are in agreement with the plan.  They have been advised to contact the office for any questions, concerns, or changes related to their health.

## 2024-11-06 NOTE — PROGRESS NOTES
"    PATIENT INFORMATION  Nelda Gaspar       - 1975    CHIEF COMPLAINT  Chief Complaint   Patient presents with    Constipation       HISTORY OF PRESENT ILLNESS  Had [facility] [place] [facility]    Number one issue is her Constipation- teated with BID Amitiza and prn Miralax and Dulcolax    Moving at best twice a week so will add fiber and daily Miralax so will shoot for QOD of possible and PRN dulcolax afte 2 days is fine    Reviewd Ged and GLP-1 and diet recommendations            REVIEWED PERTINENT RESULTS/ LABS  No results found for: \"CASEREPORT\", \"FINALDX\"  Lab Results   Component Value Date    HGB 12.7 2023    MCV 92.4 2023     (H) 2023    ALT 14 2014    AST 16 2014    HGBA1C 5.00 2019      No results found.    REVIEW OF SYSTEMS  Review of Systems   Constitutional:  Negative for activity change, chills, fever and unexpected weight change.   HENT:  Negative for congestion.    Eyes:  Negative for visual disturbance.   Respiratory:  Negative for shortness of breath.    Cardiovascular:  Negative for chest pain and palpitations.   Gastrointestinal:  Positive for abdominal distention, abdominal pain, constipation and nausea. Negative for blood in stool.        Reflux   Endocrine: Negative for cold intolerance and heat intolerance.   Genitourinary:  Negative for hematuria.   Musculoskeletal:  Negative for gait problem.   Skin:  Negative for color change.   Allergic/Immunologic: Negative for immunocompromised state.   Neurological:  Negative for weakness and light-headedness.   Hematological:  Negative for adenopathy.   Psychiatric/Behavioral:  Negative for sleep disturbance. The patient is not nervous/anxious.          ACTIVE PROBLEMS  Patient Active Problem List    Diagnosis     Back pain [M54.9]     Depression [F32.A]     Dysphagia [R13.10]     Edema [R60.9]     Hypoglycemia [E16.2]     GERD (gastroesophageal reflux disease) " [K21.9]     Constipation [K59.00]     PCOS (polycystic ovarian syndrome) [E28.2]     Morbid obesity [E66.01]     Gastroesophageal reflux disease [K21.9]     Insulin resistance [E88.819]     Polycystic ovary syndrome [E28.2]     Bilateral sacroiliitis [M46.1]     Hypercholesterolemia [E78.00]     Pain in the coccyx [M53.3]     Insulin resistance syndrome [E88.810]     Irritable bowel syndrome with constipation [K58.1]     Dyslipidemia [E78.5]     Well female exam with routine gynecological exam [Z01.419]     Abnormal uterine bleeding (AUB) [N93.9]     Metabolic syndrome X [E88.810]     Lynne's esophagus [K22.70]     DDD (degenerative disc disease), cervical [M50.30]     Anxiety disorder [F41.9]     Long-term current use of opiate analgesic [Z79.891]     Arthritis [M19.90]     Hyperlipidemia [E78.5]          PAST MEDICAL HISTORY  Past Medical History:   Diagnosis Date    Anxiety     Asthma     DDD (degenerative disc disease), lumbar     Dyslipidemia     GERD (gastroesophageal reflux disease)     IBS (irritable bowel syndrome)     Multiple allergies     PCOS (polycystic ovarian syndrome)          SURGICAL HISTORY  Past Surgical History:   Procedure Laterality Date    WISDOM TOOTH EXTRACTION           FAMILY HISTORY  Family History   Problem Relation Age of Onset    Diabetes Mother     Osteoporosis Mother     Other Mother         hysterectomy in her 40's.  Patient is 74 and still has PMB.     Heart disease Father         Stent placement    Diabetes Father     Other Father         blood clots    Breast cancer Paternal Grandmother         50's         SOCIAL HISTORY  Social History     Occupational History    Not on file   Tobacco Use    Smoking status: Former     Current packs/day: 0.00     Types: Cigarettes     Quit date:      Years since quittin.8    Smokeless tobacco: Never   Vaping Use    Vaping status: Never Used   Substance and Sexual Activity    Alcohol use: Yes     Alcohol/week: 2.0 standard drinks of  alcohol     Types: 1 Glasses of wine, 1 Shots of liquor per week     Comment: socially    Drug use: No    Sexual activity: Yes     Partners: Male     Birth control/protection: None         CURRENT MEDICATIONS    Current Outpatient Medications:     albuterol sulfate  (90 Base) MCG/ACT inhaler, Inhale 2 puffs Every 4 (Four) Hours As Needed., Disp: , Rfl:     buPROPion SR (WELLBUTRIN SR) 150 MG 12 hr tablet, Take 1 tablet by mouth Daily., Disp: , Rfl:     CBD oil (cannabidiol) capsule, See Admin Instructions., Disp: , Rfl:     celecoxib (CeleBREX) 100 MG capsule, Celebrex 100 mg capsule, Disp: , Rfl:     citalopram (CeleXA) 40 MG tablet, citalopram 40 mg tablet, Disp: , Rfl:     clarithromycin (BIAXIN) 500 MG tablet, , Disp: , Rfl:     cyclobenzaprine (FLEXERIL) 10 MG tablet, , Disp: , Rfl:     diclofenac (VOLTAREN) 75 MG EC tablet, TAKE 1 TABLET BY MOUTH 2 TIMES A DAY WITH FOOD AS DIRECTED, Disp: , Rfl:     famotidine (PEPCID) 40 MG tablet, As Needed., Disp: , Rfl:     fenofibrate 160 MG tablet, Take 1 tablet by mouth Daily., Disp: , Rfl:     fluticasone (Flonase Allergy Relief) 50 MCG/ACT nasal spray, , Disp: , Rfl:     lidocaine (LIDODERM) 5 %, Place 1 patch on the skin as directed by provider., Disp: , Rfl:     lubiprostone (AMITIZA) 24 MCG capsule, Take 1 capsule by mouth., Disp: , Rfl:     Melatonin 10 MG tablet, Take  by mouth., Disp: , Rfl:     metFORMIN (GLUCOPHAGE) 500 MG tablet, Take 1 tablet by mouth 3 (Three) Times a Day With Meals., Disp: , Rfl:     NON FORMULARY, Steroid injections to hip/back every 6 weeks.  (epidural injections in each hip), Disp: , Rfl:     ondansetron ODT (ZOFRAN-ODT) 4 MG disintegrating tablet, Place 1 tablet every 6-8 hours by translingual route., Disp: , Rfl:     Ozempic, 1 MG/DOSE, 4 MG/3ML solution pen-injector, 1 (One) Time Per Week., Disp: , Rfl:     polyethylene glycol (MIRALAX) 17 GM/SCOOP powder, Take 17 g by mouth Daily., Disp: , Rfl:     pravastatin (PRAVACHOL) 20  "MG tablet, Take 1 tablet by mouth Daily., Disp: , Rfl:     promethazine (PHENERGAN) 12.5 MG tablet, , Disp: , Rfl:     senna (SENOKOT) 8.6 MG tablet, Take 1 tablet by mouth Daily., Disp: , Rfl:     traZODone (DESYREL) 50 MG tablet, Take 1 tablet by mouth Every Night., Disp: , Rfl:     ALLERGIES  Bacitra-neomycin-polymyxin-hc, Bacitracin-polymyxin b, and Sulfate    VITALS  Vitals:    11/06/24 1015   BP: 112/76   BP Location: Left arm   Patient Position: Sitting   Cuff Size: Adult   Weight: 76.9 kg (169 lb 9.6 oz)   Height: 160 cm (63\")       PHYSICAL EXAM  Debilities/Disabilities Identified: None  Emotional Behavior: Appropriate  Wt Readings from Last 3 Encounters:   11/06/24 76.9 kg (169 lb 9.6 oz)   10/31/24 77.1 kg (170 lb)   06/23/24 81.6 kg (180 lb)     Ht Readings from Last 1 Encounters:   11/06/24 160 cm (63\")     Body mass index is 30.04 kg/m².  Physical Exam  Constitutional:       Appearance: She is well-developed. She is not diaphoretic.   HENT:      Head: Normocephalic and atraumatic.   Eyes:      General: No scleral icterus.     Conjunctiva/sclera: Conjunctivae normal.      Pupils: Pupils are equal, round, and reactive to light.   Neck:      Thyroid: No thyromegaly.   Cardiovascular:      Rate and Rhythm: Normal rate and regular rhythm.      Heart sounds: Normal heart sounds. No murmur heard.     No gallop.   Pulmonary:      Effort: Pulmonary effort is normal.      Breath sounds: Normal breath sounds. No wheezing or rales.   Abdominal:      General: Bowel sounds are normal. There is no distension or abdominal bruit.      Palpations: Abdomen is soft. There is no shifting dullness, fluid wave or mass.      Tenderness: There is abdominal tenderness in the right upper quadrant, epigastric area and left upper quadrant. There is no guarding. Negative signs include Nava's sign.      Hernia: There is no hernia in the ventral area.   Musculoskeletal:         General: Normal range of motion.      Cervical back: " Normal range of motion and neck supple.   Lymphadenopathy:      Cervical: No cervical adenopathy.   Skin:     General: Skin is warm and dry.      Findings: No erythema or rash.   Neurological:      Mental Status: She is alert and oriented to person, place, and time.   Psychiatric:         Mood and Affect: Mood normal.         Behavior: Behavior normal.         CLINICAL DATA REVIEWED   reviewed previous lab results and integrated with today's visit, reviewed notes from other physicians and/or last GI encounter, reviewed previous endoscopy results and available photos, reviewed surgical pathology results from previous biopsies    ASSESSMENT  Diagnoses and all orders for this visit:    Dyspepsia  -     Case Request; Standing  -     Case Request    Gastroesophageal reflux disease with esophagitis without hemorrhage    Chronic idiopathic constipation    Other orders  -     CBD oil (cannabidiol) capsule; See Admin Instructions.  -     Follow Anesthesia Guidelines / Protocol; Future  -     Obtain Informed Consent; Standing  -     POC Urine Pregnancy; Standing          PLAN  Return in about 3 months (around 2/6/2025).    I have discussed the above plan with the patient.  They verbalize understanding and are in agreement with the plan.  They have been advised to contact the office for any questions, concerns, or changes related to their health.

## 2024-11-08 ENCOUNTER — PATIENT ROUNDING (BHMG ONLY) (OUTPATIENT)
Dept: GASTROENTEROLOGY | Facility: CLINIC | Age: 49
End: 2024-11-08
Payer: COMMERCIAL

## 2024-11-08 NOTE — PROGRESS NOTES
[facility]  [address]   [place] [address] [phone].    Before we get started may I verify your date of birth? 1975    I am calling to officially welcome you to our practice and ask about your recent visit. Is this a good time to talk?     Tell me about your visit with us. What things went well?  TC       We're always looking for ways to make our patients' experiences even better. Do you have recommendations on ways we may improve?      Overall were you satisfied with your first visit to our practice?        I appreciate you taking the time to speak with me today. Is there anything else I can do for you?       Thank you, and have a great day.

## 2024-11-26 ENCOUNTER — ANESTHESIA EVENT (OUTPATIENT)
Dept: SURGERY | Facility: SURGERY CENTER | Age: 49
End: 2024-11-26
Payer: COMMERCIAL

## 2024-11-26 ENCOUNTER — ANESTHESIA (OUTPATIENT)
Dept: SURGERY | Facility: SURGERY CENTER | Age: 49
End: 2024-11-26
Payer: COMMERCIAL

## 2024-11-26 ENCOUNTER — HOSPITAL ENCOUNTER (OUTPATIENT)
Facility: SURGERY CENTER | Age: 49
Setting detail: HOSPITAL OUTPATIENT SURGERY
Discharge: HOME | End: 2024-11-26
Attending: INTERNAL MEDICINE | Admitting: INTERNAL MEDICINE
Payer: COMMERCIAL

## 2024-11-26 VITALS
RESPIRATION RATE: 16 BRPM | TEMPERATURE: 97 F | SYSTOLIC BLOOD PRESSURE: 112 MMHG | OXYGEN SATURATION: 97 % | HEART RATE: 80 BPM | BODY MASS INDEX: 30.11 KG/M2 | WEIGHT: 170 LBS | DIASTOLIC BLOOD PRESSURE: 78 MMHG

## 2024-11-26 DIAGNOSIS — R10.13 DYSPEPSIA: ICD-10-CM

## 2024-11-26 LAB
B-HCG UR QL: NEGATIVE
EXPIRATION DATE: NORMAL
INTERNAL NEGATIVE CONTROL: NEGATIVE
INTERNAL POSITIVE CONTROL: POSITIVE
Lab: NORMAL

## 2024-11-26 PROCEDURE — 43239 EGD BIOPSY SINGLE/MULTIPLE: CPT | Performed by: INTERNAL MEDICINE

## 2024-11-26 PROCEDURE — 88305 TISSUE EXAM BY PATHOLOGIST: CPT | Performed by: INTERNAL MEDICINE

## 2024-11-26 PROCEDURE — 25010000002 GLYCOPYRROLATE 0.2 MG/ML SOLUTION: Performed by: NURSE ANESTHETIST, CERTIFIED REGISTERED

## 2024-11-26 PROCEDURE — 81025 URINE PREGNANCY TEST: CPT | Performed by: INTERNAL MEDICINE

## 2024-11-26 PROCEDURE — 25010000002 PROPOFOL 10 MG/ML EMULSION: Performed by: NURSE ANESTHETIST, CERTIFIED REGISTERED

## 2024-11-26 PROCEDURE — 25010000002 LIDOCAINE 2% SOLUTION: Performed by: NURSE ANESTHETIST, CERTIFIED REGISTERED

## 2024-11-26 RX ORDER — LIDOCAINE HYDROCHLORIDE 10 MG/ML
0.5 INJECTION, SOLUTION INFILTRATION; PERINEURAL ONCE AS NEEDED
Status: DISCONTINUED | OUTPATIENT
Start: 2024-11-26 | End: 2024-11-26 | Stop reason: HOSPADM

## 2024-11-26 RX ORDER — GLYCOPYRROLATE 0.2 MG/ML
INJECTION INTRAMUSCULAR; INTRAVENOUS AS NEEDED
Status: DISCONTINUED | OUTPATIENT
Start: 2024-11-26 | End: 2024-11-26 | Stop reason: SURG

## 2024-11-26 RX ORDER — SODIUM CHLORIDE 0.9 % (FLUSH) 0.9 %
10 SYRINGE (ML) INJECTION AS NEEDED
Status: DISCONTINUED | OUTPATIENT
Start: 2024-11-26 | End: 2024-11-26 | Stop reason: HOSPADM

## 2024-11-26 RX ORDER — LIDOCAINE HYDROCHLORIDE 20 MG/ML
INJECTION, SOLUTION INFILTRATION; PERINEURAL AS NEEDED
Status: DISCONTINUED | OUTPATIENT
Start: 2024-11-26 | End: 2024-11-26 | Stop reason: SURG

## 2024-11-26 RX ORDER — PROPOFOL 10 MG/ML
VIAL (ML) INTRAVENOUS CONTINUOUS PRN
Status: DISCONTINUED | OUTPATIENT
Start: 2024-11-26 | End: 2024-11-26 | Stop reason: SURG

## 2024-11-26 RX ADMIN — LIDOCAINE HYDROCHLORIDE 60 MG: 20 INJECTION, SOLUTION INFILTRATION; PERINEURAL at 12:51

## 2024-11-26 RX ADMIN — PROPOFOL 200 MCG/KG/MIN: 10 INJECTION, EMULSION INTRAVENOUS at 12:51

## 2024-11-26 RX ADMIN — GLYCOPYRROLATE 0.2 MG: 0.2 INJECTION, SOLUTION INTRAMUSCULAR; INTRAVENOUS at 12:51

## 2024-11-26 RX ADMIN — PROPOFOL 100 MG: 10 INJECTION, EMULSION INTRAVENOUS at 12:52

## 2024-11-26 NOTE — ANESTHESIA POSTPROCEDURE EVALUATION
Patient: Nelda Gaspar    Procedure Summary       Date: 11/26/24 Room / Location: [facility]  [facility]    Anesthesia Start: 1247 Anesthesia Stop: 1307    Procedure: ESOPHAGOGASTRODUODENOSCOPY (Esophagus) Diagnosis:       Dyspepsia      Reflux esophagitis      Gastritis      (Dyspepsia [R10.13])    Surgeons: Hugh Granger MD Provider: Odell Pacheco MD    Anesthesia Type: MAC ASA Status: 2            Anesthesia Type: MAC    Vitals  Vitals Value Taken Time   /66 11/26/24 1310   Temp     Pulse 89 11/26/24 1310   Resp 16 11/26/24 1310   SpO2 97 % 11/26/24 1310           Post Anesthesia Care and Evaluation    Patient location during evaluation: bedside  Patient participation: complete - patient participated  Level of consciousness: awake and alert  Pain management: adequate    Airway patency: patent  Anesthetic complications: No anesthetic complications    Cardiovascular status: acceptable  Respiratory status: acceptable  Hydration status: acceptable    Comments: /66   Pulse 89   Temp 36.1 °C (97 °F) (Temporal)   Resp 16   Wt 77.1 kg (170 lb)   LMP 10/21/2024 (Approximate)   SpO2 97%   BMI 30.11 kg/m²

## 2024-11-26 NOTE — ANESTHESIA PREPROCEDURE EVALUATION
Anesthesia Evaluation                  Airway   Mallampati: II  TM distance: >3 FB  Neck ROM: full  Dental - normal exam     Pulmonary    (+) asthma,  (-) decreased breath sounds, wheezes  Cardiovascular - normal exam    (+) hyperlipidemia      Neuro/Psych  (+) psychiatric history  GI/Hepatic/Renal/Endo    (+) obesity, morbid obesity, GERD    Musculoskeletal     (+) back pain  Abdominal    Substance History      OB/GYN          Other   arthritis,                       Anesthesia Plan    ASA 2     MAC     intravenous induction     Anesthetic plan, risks, benefits, and alternatives have been provided, discussed and informed consent has been obtained with: patient.        CODE STATUS:

## 2024-11-26 NOTE — BRIEF OP NOTE
ESOPHAGOGASTRODUODENOSCOPY  Progress Note    Nelda Gaspar  11/26/2024    Pre-op Diagnosis:   Dyspepsia [R10.13]       Post-Op Diagnosis Codes:     * Dyspepsia [R10.13]     * Reflux esophagitis [K21.00]     * Gastritis [K29.70]    Procedure/CPT® Codes:        Procedure(s):  ESOPHAGOGASTRODUODENOSCOPY              Surgeon(s):  Hugh Granger MD    Anesthesia: Monitored Anesthesia Care    Staff:   Endo Technician: Alicia Silva RN  Endo Nurse: Pia Bear RN         Estimated Blood Loss: none    Urine Voided: * No values recorded between 11/26/2024 12:47 PM and 11/26/2024  1:00 PM *    Specimens:                Specimens       ID Source Type Tests Collected By Collected At Frozen?    A Gastric, Antrum Tissue TISSUE PATHOLOGY EXAM   Hugh Granger MD 11/26/24 1256 No    Description: antral bx    This specimen was not marked as sent.    B Esophagus, Distal Tissue TISSUE PATHOLOGY EXAM   Hugh Granger MD 11/26/24 1258 No    Description: distal esophagus bx    This specimen was not marked as sent.                  Drains: * No LDAs found *    Findings: Normal Duodenum  Moderate Gastritis-Biopsy  Reflux Esophagitis-Biopsy        Complications: None          Hugh Granger MD     Date: 11/26/2024  Time: 13:01 EST

## 2024-11-26 NOTE — INTERVAL H&P NOTE
Vital signs  /73 (BP Location: Left arm, Patient Position: Lying)   Pulse 86   Temp 97 °F (36.1 °C) (Temporal)   Resp 16   Wt 77.1 kg (170 lb)   LMP 10/21/2024 (Approximate)   SpO2 100%   BMI 30.11 kg/m²     H&P reviewed. The patient was examined and there are no changes to the H&P.

## 2024-11-27 LAB
CYTO UR: NORMAL
LAB AP CASE REPORT: NORMAL
PATH REPORT.FINAL DX SPEC: NORMAL
PATH REPORT.GROSS SPEC: NORMAL

## 2025-03-12 ENCOUNTER — OFFICE VISIT (OUTPATIENT)
Dept: OBSTETRICS AND GYNECOLOGY | Age: 50
End: 2025-03-12
Payer: COMMERCIAL

## 2025-03-12 VITALS
SYSTOLIC BLOOD PRESSURE: 112 MMHG | WEIGHT: 164 LBS | DIASTOLIC BLOOD PRESSURE: 74 MMHG | BODY MASS INDEX: 29.06 KG/M2 | HEIGHT: 63 IN

## 2025-03-12 DIAGNOSIS — Z12.4 SCREENING FOR MALIGNANT NEOPLASM OF CERVIX: ICD-10-CM

## 2025-03-12 DIAGNOSIS — N89.8 VAGINAL DISCHARGE: ICD-10-CM

## 2025-03-12 DIAGNOSIS — Z01.419 WELL FEMALE EXAM WITH ROUTINE GYNECOLOGICAL EXAM: Primary | ICD-10-CM

## 2025-03-12 DIAGNOSIS — Z11.51 SCREENING FOR HUMAN PAPILLOMAVIRUS (HPV): ICD-10-CM

## 2025-03-12 DIAGNOSIS — N92.6 IRREGULAR MENSTRUAL BLEEDING: ICD-10-CM

## 2025-03-12 DIAGNOSIS — Z11.3 SCREEN FOR STD (SEXUALLY TRANSMITTED DISEASE): ICD-10-CM

## 2025-03-12 NOTE — PROGRESS NOTES
Subjective     History of Present Illness    Chief Complaint   Patient presents with    Gynecologic Exam     Ae- last pap 3/6/2024 Neg, Hpv Neg, mammo 10/3/2023  Cc: discuss irregular bleeding, sometimes bleeding every 2 weeks       Nelda Gaspar is a 49 y.o. female  who presents for annual exam.  Menses are irregular, lasting 4-7 days, dysmenorrhea none     C/o irregular bleeding x 6 months. Bleeding every 2 weeks, sometimes light sometimes heavy. Lasting 1 week. No new medications. Has lost 40 lbs over the last year. On ozempic and metformin. No new sex partners, is sexually active. Agreeable to STD testing. TSH normal two weeks ago. CBC also normal, not anemic, two weeks ago. Pt reports recent A1c was about 5.2%.  Follows with PCP, completed wellness labs with them.   Colonoscopy utd, normal in , f/u 10 yrs.   Screening mammogram scheduled for May 2nd, PCP put in orders.     Relevant data reviewed:  MAMMO SCREENING BILATERAL W CAD (10/03/2023 10:30)   TSH (2025 14:05)   CBC AND DIFFERENTIAL (2025 14:05)   FSH & LH (2024 11:42)     Obstetric History:  OB History          0    Para   0    Term   0       0    AB   0    Living   0         SAB   0    IAB   0    Ectopic   0    Molar   0    Multiple   0    Live Births   0               Menstrual History:     Patient's last menstrual period was 2025 (exact date).           Current contraception: condoms  History of abnormal Pap smear: no  Received Gardasil immunization: no  Perform regular self breast exam: yes -    Family history of uterine or ovarian cancer: no  Family History of colon cancer: no  Family history of breast cancer: yes - PGM dx 40s    PAP: done today  Mammogram: ordered - PCP ordered, scheduled for 2025  Colonoscopy: up to date - normal in , f/u 10 yrs  DEXA: not indicated    Exercise: moderately active  Calcium/Vitamin D: adequate intake    The following portions of the patient's history were  "reviewed and updated as appropriate: allergies, current medications, past family history, past medical history, past social history, past surgical history, and problem list.    Review of Systems  Pertinent items are noted in HPI.       Objective   Physical Exam    /74   Ht 160 cm (63\")   Wt 74.4 kg (164 lb)   LMP 02/20/2025 (Exact Date)   BMI 29.05 kg/m²      General: alert, appears stated age, cooperative, and no distress   Heart: regular rate and rhythm, S1, S2 normal, no murmur, click, rub or gallop   Lungs: clear to auscultation bilaterally   Abdomen: soft, non-tender, without masses or organomegaly   Breast: inspection negative, no nipple discharge or bleeding, no masses or nodularity palpable   External genitalia/Vulva: External genitalia including bartholin's glands, Urethra, [place]'s gland and urethra meatus are normal and Bladder appears normal without significant prolapse    Vagina: normal mucosa, normal discharge   Cervix: no lesions   Uterus: normal size and non-tender   Adnexa: normal adnexa and no mass, fullness, tenderness   Neurologic: Alert and Oriented x3   Psychiatric: Normal affect, judgement, and mood       Assessment & Plan   Diagnoses and all orders for this visit:    1. Well female exam with routine gynecological exam (Primary)  -     IGP, Apt HPV,rfx 16 / 18,45    2. Screening for human papillomavirus (HPV)  -     IGP, Apt HPV,rfx 16 / 18,45    3. Screening for malignant neoplasm of cervix  -     IGP, Apt HPV,rfx 16 / 18,45    4. Screen for STD (sexually transmitted disease)  -     NuSwab VG+ - Swab, Vagina    5. Vaginal discharge  -     NuSwab VG+ - Swab, Vagina    6. Irregular menstrual bleeding  -     NuSwab VG+ - Swab, Vagina  -     FSH & LH  -     Prolactin  -     Estradiol          PAP smear with HPV co-testing completed today  NuSwab VG+ completed today  Blood tests: FSH, LH, Prolactin, Estradiol.  Will call patient with results and treat accordingly.   All questions " answered.  Breast self exam technique reviewed and patient encouraged to perform self-exam monthly.  Physical activity and regular exercise encouraged.  Discussed healthy lifestyle modifications.  Discussed calcium and vitamin D needs to prevent osteoporosis.  --Irregular bleeding: NuSwab VG plus and anovulatory lab panel completed today.  Advised patient to follow-up in 1 to 2 weeks for ultrasound and appointment to further investigate irregular bleeding.  --Patient reports screening mammogram scheduled for May 2, 2025.  PCP placed orders for mammogram.    Return in about 1 week (around 3/19/2025) for 1-2 weeks - Ultrasound and appt - irregular bleeding *has labs today*.       Kitty Ely PA-C  3/12/2025 14:03 EDT

## 2025-03-13 LAB
ESTRADIOL SERPL-MCNC: 65.1 PG/ML
FSH SERPL-ACNC: 18.5 MIU/ML
LH SERPL-ACNC: 16.4 MIU/ML
PROLACTIN SERPL-MCNC: 9.7 NG/ML (ref 4.8–33.4)

## 2025-03-14 LAB
A VAGINAE DNA VAG QL NAA+PROBE: ABNORMAL SCORE
BVAB2 DNA VAG QL NAA+PROBE: ABNORMAL SCORE
C ALBICANS DNA VAG QL NAA+PROBE: NEGATIVE
C GLABRATA DNA VAG QL NAA+PROBE: POSITIVE
C TRACH DNA SPEC QL NAA+PROBE: NEGATIVE
CYTOLOGIST CVX/VAG CYTO: NORMAL
CYTOLOGY CVX/VAG DOC CYTO: NORMAL
CYTOLOGY CVX/VAG DOC THIN PREP: NORMAL
DX ICD CODE: NORMAL
HPV I/H RISK 4 DNA CVX QL PROBE+SIG AMP: NEGATIVE
MEGA1 DNA VAG QL NAA+PROBE: ABNORMAL SCORE
N GONORRHOEA DNA VAG QL NAA+PROBE: NEGATIVE
OTHER STN SPEC: NORMAL
SERVICE CMNT-IMP: NORMAL
STAT OF ADQ CVX/VAG CYTO-IMP: NORMAL
T VAGINALIS DNA VAG QL NAA+PROBE: NEGATIVE

## 2025-03-20 ENCOUNTER — OFFICE VISIT (OUTPATIENT)
Dept: OBSTETRICS AND GYNECOLOGY | Age: 50
End: 2025-03-20
Payer: COMMERCIAL

## 2025-03-20 VITALS
WEIGHT: 161 LBS | DIASTOLIC BLOOD PRESSURE: 78 MMHG | HEIGHT: 63 IN | BODY MASS INDEX: 28.53 KG/M2 | SYSTOLIC BLOOD PRESSURE: 112 MMHG

## 2025-03-20 DIAGNOSIS — D25.9 UTERINE LEIOMYOMA, UNSPECIFIED LOCATION: Primary | ICD-10-CM

## 2025-03-20 DIAGNOSIS — N83.202 CYST OF LEFT OVARY: ICD-10-CM

## 2025-03-20 DIAGNOSIS — B37.9 CANDIDA GLABRATA INFECTION: ICD-10-CM

## 2025-03-20 DIAGNOSIS — N39.3 SUI (STRESS URINARY INCONTINENCE, FEMALE): ICD-10-CM

## 2025-03-20 RX ORDER — AMOXICILLIN 400 MG/5ML
400 POWDER, FOR SUSPENSION ORAL 3 TIMES DAILY
COMMUNITY

## 2025-03-20 NOTE — PROGRESS NOTES
"Subjective     History of Present Illness  Nelda Gaspar is a 49 y.o.  female is being seen today for   Chief Complaint   Patient presents with    Gynecologic Exam     Gyn u/s- pt reports no bleeding since last ov 3/12/2025     Patient here today for ultrasound to further investigate irregular bleeding. C/o irregular bleeding x 6 months. Bleeding every 2 weeks, sometimes light sometimes heavy. Bleeding typically lasting 1 week. FSH completed two weeks ago and was normal at 18.5, not perimenopause/menopause.   Pt states she did not like nuvaring. Also did not like OCPs in past, gained weight and constipation. Not currently on birth control.  C/o ALONZO x 3 years. Wears pads daily. No vaginal itching. No prolapse on pelvic exam 2 weeks ago. Did just start amoxicillin for tooth infection.   Recent nuswab showed candida glabrata yeast infection, will plan to discuss treatment options today.    Relevant data reviewed:  US Non-ob Transvaginal (2025 13:09)   NuSwab VG+ - Swab, Vagina (2025 14:14)   FSH & LH (2025 14:10)     The following portions of the patient's history were reviewed and updated as appropriate: allergies, current medications, past family history, past medical history, past social history, past surgical history and problem list.    /78   Ht 160 cm (63\")   Wt 73 kg (161 lb)   LMP 2025 (Exact Date)   BMI 28.52 kg/m²         Review of Systems   Constitutional: Negative.    HENT: Negative.     Eyes: Negative.    Respiratory: Negative.     Cardiovascular: Negative.    Gastrointestinal: Negative.    Endocrine: Negative.    Genitourinary:  Positive for menstrual problem.        +stress urinary incontinence   Musculoskeletal: Negative.    Skin: Negative.    Allergic/Immunologic: Negative.    Neurological: Negative.    Hematological: Negative.    Psychiatric/Behavioral: Negative.         Objective   Physical Exam  Constitutional:       General: She is not in acute " distress.  Cardiovascular:      Rate and Rhythm: Normal rate and regular rhythm.      Pulses: Normal pulses.      Heart sounds: Normal heart sounds.   Pulmonary:      Effort: Pulmonary effort is normal.      Breath sounds: Normal breath sounds.   Neurological:      Mental Status: She is alert and oriented to person, place, and time.   Psychiatric:         Mood and Affect: Mood normal.         Behavior: Behavior normal.         Thought Content: Thought content normal.         Judgment: Judgment normal.         Transvaginal ultrasound 3/20/2025:  Impression:       1.  Uterus: Normal size, with uterine volume of 72.61 ml, and Anteverted     2.  Endometrium: Normal non-menopausal thickness and 6.70 mm     3.  Myometrium: Heterogenous texture and Single fibroid measuring 1.81 cm x 1.90 cm pedunculated off of the anterior right portion of the uterus     4.  Ovaries  Left:    Simple cyst with single septation measuring 2.76 cm x 2.25 cm, ovarian volume 20.27 mL  Right:  Normal small follicles    Assessment & Plan   Diagnoses and all orders for this visit:    1. Uterine leiomyoma, unspecified location (Primary)    2. Candida glabrata infection    3. ALONZO (stress urinary incontinence, female)  -     Ambulatory Referral to Physical Therapy for Evaluation & Treatment    4. Cyst of left ovary    -Reviewed ultrasound findings with patient: small uterine fibroid, simple cyst of left ovary, otherwise normal ultrasound.  -Discussed treatment options for irregular bleeding as surgical consult with Dr. Orozco for fibroid, birth control for bleeding regulation, or EMB.  Also discussed this with Dr. Roach in office, patient is not indicated for EMB but can still offer to patient.  Patient declines surgical consult, birth control, and EMB.  Patient would prefer observation management.  Plan to follow-up in 3 months with Dr. Orozco to re-assess irregular bleeding.  -Discussed boric acid vaginal suppository or Genticin violet for Candida  glabrata.  Patient would like boric acid vaginal suppositories.  Patient plans to buy these at the pharmacy as they can be purchased over-the-counter, instructed to insert 1 boric acid 600 mg vaginal suppository every night for 14 nights.  Informed patient vaginal boric acid suppositories can be fatal if ingested by mouth, keep out of reach of small children.  -Pelvic floor PT referral sent for ALONZO.    All questions answered. Patient verbalizes understanding and is agreeable to plan.  Return in about 3 months (around 6/20/2025) for gyn f/u with Dr. Orozco - irregular bleeding f/u.         Kitty Ely PA-C  3/20/2025 13:56 EDT

## 2025-07-16 ENCOUNTER — OFFICE VISIT (OUTPATIENT)
Dept: OBSTETRICS AND GYNECOLOGY | Age: 50
End: 2025-07-16
Payer: COMMERCIAL

## 2025-07-16 VITALS
BODY MASS INDEX: 29.41 KG/M2 | SYSTOLIC BLOOD PRESSURE: 122 MMHG | WEIGHT: 166 LBS | DIASTOLIC BLOOD PRESSURE: 78 MMHG | HEIGHT: 63 IN

## 2025-07-16 DIAGNOSIS — N93.9 ABNORMAL UTERINE BLEEDING (AUB): ICD-10-CM

## 2025-07-16 DIAGNOSIS — Z13.89 SCREENING FOR HEMATURIA OR PROTEINURIA: ICD-10-CM

## 2025-07-16 DIAGNOSIS — N95.1 PERIMENOPAUSE: Primary | ICD-10-CM

## 2025-07-16 PROBLEM — K59.00 CONSTIPATION: Status: RESOLVED | Noted: 2024-10-31 | Resolved: 2025-07-16

## 2025-07-16 PROBLEM — E28.2 POLYCYSTIC OVARY SYNDROME: Status: RESOLVED | Noted: 2024-02-01 | Resolved: 2025-07-16

## 2025-07-16 PROBLEM — Z01.419 WELL FEMALE EXAM WITH ROUTINE GYNECOLOGICAL EXAM: Status: RESOLVED | Noted: 2019-04-03 | Resolved: 2025-07-16

## 2025-07-16 PROBLEM — M53.3 PAIN IN THE COCCYX: Status: RESOLVED | Noted: 2019-06-03 | Resolved: 2025-07-16

## 2025-07-16 PROBLEM — R60.9 EDEMA: Status: RESOLVED | Noted: 2024-10-31 | Resolved: 2025-07-16

## 2025-07-16 PROBLEM — M54.9 BACK PAIN: Status: RESOLVED | Noted: 2024-10-31 | Resolved: 2025-07-16

## 2025-07-16 LAB
BILIRUB BLD-MCNC: NEGATIVE MG/DL
CLARITY, POC: CLEAR
COLOR UR: YELLOW
GLUCOSE UR STRIP-MCNC: NEGATIVE MG/DL
KETONES UR QL: NEGATIVE
LEUKOCYTE EST, POC: ABNORMAL
NITRITE UR-MCNC: NEGATIVE MG/ML
PH UR: 6.5 [PH] (ref 5–8)
PROT UR STRIP-MCNC: NEGATIVE MG/DL
RBC # UR STRIP: NEGATIVE /UL
SP GR UR: 1.02 (ref 1–1.03)
UROBILINOGEN UR QL: ABNORMAL

## 2025-07-16 PROCEDURE — 81002 URINALYSIS NONAUTO W/O SCOPE: CPT | Performed by: OBSTETRICS & GYNECOLOGY

## 2025-07-16 PROCEDURE — 99213 OFFICE O/P EST LOW 20 MIN: CPT | Performed by: OBSTETRICS & GYNECOLOGY

## 2025-07-16 RX ORDER — TRAZODONE HYDROCHLORIDE 50 MG/1
50-100 TABLET ORAL
COMMUNITY
End: 2025-07-16

## 2025-07-16 RX ORDER — DOXYCYCLINE 100 MG/1
CAPSULE ORAL
COMMUNITY
End: 2025-07-16

## 2025-07-16 RX ORDER — CHLORHEXIDINE GLUCONATE ORAL RINSE 1.2 MG/ML
SOLUTION DENTAL
COMMUNITY
End: 2025-07-16

## 2025-07-16 RX ORDER — HYDROCODONE BITARTRATE AND ACETAMINOPHEN 7.5; 325 MG/1; MG/1
1 TABLET ORAL EVERY 6 HOURS PRN
COMMUNITY

## 2025-07-16 RX ORDER — FLUCONAZOLE 150 MG/1
TABLET ORAL
COMMUNITY

## 2025-07-16 RX ORDER — PRAVASTATIN SODIUM 20 MG
20 TABLET ORAL DAILY
COMMUNITY
End: 2025-07-16

## 2025-07-16 NOTE — ASSESSMENT & PLAN NOTE
After 3-month interval of amenorrhea the patient had a normal 5-day period.  We discussed that the skips and periodic bleeding events may occur with perimenopause.

## 2025-07-16 NOTE — PROGRESS NOTES
[facility]   Obstetrics and Gynecology     2025      Patient:  Nelda Gaspar   MR#:[id number]    Office note    Chief Complaint   Patient presents with    Vaginal Bleeding     Vaginal bleeding has stopped stated today is a follow up for possible cyst       Subjective     History of Present Illness  50 y.o. female  for follow-up on previous episode of abnormal bleeding in March and was seen by physician assistant in the practice.  Labs at that time showed slightly elevated FSH in the perimenopausal range and a roughly unremarkable ultrasound.  After the patient's bleeding stopped she went on a 3-month interval with no menstrual bleeding and then had a 5-day period a couple weeks ago.    Relevant data reviewed:  US Non-ob Transvaginal (2025 13:09)   FSH & LH (2025 14:10)      Objective   Patient Active Problem List   Diagnosis    Insulin resistance syndrome    Irritable bowel syndrome with constipation    Dyslipidemia    Abnormal uterine bleeding (AUB)    Lynne's esophagus    DDD (degenerative disc disease), cervical    Metabolic syndrome X    Hypercholesterolemia    Hyperlipidemia    Anxiety disorder    Depression    Dysphagia    Hypoglycemia    Insulin resistance    Long-term current use of opiate analgesic    Morbid obesity    Bilateral sacroiliitis    Gastroesophageal reflux disease    GERD (gastroesophageal reflux disease)    PCOS (polycystic ovarian syndrome)    Dyspepsia       Past Medical History:   Diagnosis Date    Anxiety     Asthma     DDD (degenerative disc disease), lumbar     Dyslipidemia     GERD (gastroesophageal reflux disease)     IBS (irritable bowel syndrome)     Multiple allergies     PCOS (polycystic ovarian syndrome)      Past Surgical History:   Procedure Laterality Date    COLONOSCOPY      ENDOSCOPY N/A 2024    Procedure: ESOPHAGOGASTRODUODENOSCOPY;  Surgeon: Hguh Granger MD;  Location: [facility] MAIN OR;  Service:  Gastroenterology;  Laterality: N/A;  reflux, gastritis    TONSILLECTOMY      WISDOM TOOTH EXTRACTION       Obstetric History:  OB History          0    Para   0    Term   0       0    AB   0    Living   0         SAB   0    IAB   0    Ectopic   0    Molar   0    Multiple   0    Live Births   0               Menstrual History:     Patient's last menstrual period was 2025.       The patient has never been pregnant.  Family History   Problem Relation Age of Onset    Heart disease Father         Stent placement    Diabetes Father     Diabetes Mother     Osteoporosis Mother     Breast cancer Paternal Grandmother         50's    Ovarian cancer Neg Hx     Uterine cancer Neg Hx     Colon cancer Neg Hx      Social History     Tobacco Use    Smoking status: Former     Current packs/day: 0.00     Types: Cigarettes     Quit date:      Years since quittin.5    Smokeless tobacco: Never   Vaping Use    Vaping status: Never Used   Substance Use Topics    Alcohol use: Yes     Alcohol/week: 2.0 standard drinks of alcohol     Types: 1 Glasses of wine, 1 Shots of liquor per week     Comment: socially    Drug use: No     Bacitra-neomycin-polymyxin-hc, Bacitracin-polymyxin b, and Sulfate    Current Outpatient Medications:     buPROPion SR (WELLBUTRIN SR) 150 MG 12 hr tablet, Take 1 tablet by mouth Daily., Disp: , Rfl:     CBD (cannabidiol) oral oil, See Admin Instructions., Disp: , Rfl:     CBD oil (cannabidiol) capsule, See Admin Instructions., Disp: , Rfl:     citalopram (CeleXA) 40 MG tablet, citalopram 40 mg tablet, Disp: , Rfl:     cyclobenzaprine (FLEXERIL) 10 MG tablet, , Disp: , Rfl:     diclofenac (VOLTAREN) 75 MG EC tablet, TAKE 1 TABLET BY MOUTH 2 TIMES A DAY WITH FOOD AS DIRECTED, Disp: , Rfl:     famotidine (PEPCID) 40 MG tablet, As Needed., Disp: , Rfl:     fenofibrate 160 MG tablet, Take 1 tablet by mouth Daily., Disp: , Rfl:     fluconazole (DIFLUCAN) 150 MG tablet, TAKE 1 TABLET BY MOUTH  EVERY 72 HOURS FOR 4 DAYS, Disp: , Rfl:     fluticasone (Flonase Allergy Relief) 50 MCG/ACT nasal spray, , Disp: , Rfl:     HYDROcodone-acetaminophen (NORCO) 7.5-325 MG per tablet, Take 1 tablet by mouth Every 6 (Six) Hours As Needed., Disp: , Rfl:     lidocaine (LIDODERM) 5 %, Place 1 patch on the skin as directed by provider., Disp: , Rfl:     Melatonin 10 MG tablet, Take  by mouth., Disp: , Rfl:     metFORMIN (GLUCOPHAGE) 500 MG tablet, Take 1 tablet by mouth 3 (Three) Times a Day With Meals., Disp: , Rfl:     NON FORMULARY, Steroid injections to hip/back every 6 weeks.  (epidural injections in each hip), Disp: , Rfl:     ondansetron ODT (ZOFRAN-ODT) 4 MG disintegrating tablet, Place 1 tablet every 6-8 hours by translingual route., Disp: , Rfl:     polyethylene glycol (MIRALAX) 17 GM/SCOOP powder, Take 17 g by mouth Daily., Disp: , Rfl:     pravastatin (PRAVACHOL) 20 MG tablet, Take 1 tablet by mouth Daily., Disp: , Rfl:     promethazine (PHENERGAN) 12.5 MG tablet, , Disp: , Rfl:     senna (SENOKOT) 8.6 MG tablet, Take 1 tablet by mouth Daily., Disp: , Rfl:     traZODone (DESYREL) 50 MG tablet, Take 1 tablet by mouth Every Night., Disp: , Rfl:     The following portions of the patient's history were reviewed and updated as appropriate: allergies, current medications, past family history, past medical history, past social history, past surgical history, and problem list.    Review of Systems   Constitutional: Negative.    Respiratory: Negative.     Cardiovascular: Negative.    Gastrointestinal: Negative.    Genitourinary: Negative.    Psychiatric/Behavioral: Negative.         BP Readings from Last 3 Encounters:   07/16/25 122/78   03/20/25 112/78   03/12/25 112/74      Wt Readings from Last 3 Encounters:   07/16/25 75.3 kg (166 lb)   03/20/25 73 kg (161 lb)   03/12/25 74.4 kg (164 lb)      BMI: Estimated body mass index is 29.41 kg/m² as calculated from the following:    Height as of this encounter: 160 cm  "(63\").    Weight as of this encounter: 75.3 kg (166 lb). BSA: Estimated body surface area is 1.79 meters squared as calculated from the following:    Height as of this encounter: 160 cm (63\").    Weight as of this encounter: 75.3 kg (166 lb).    Physical Exam  Constitutional:       General: She is not in acute distress.  Pulmonary:      Effort: Pulmonary effort is normal.   Abdominal:      Palpations: Abdomen is soft.      Tenderness: There is no abdominal tenderness.   Skin:     General: Skin is warm.   Neurological:      Mental Status: She is alert.   Psychiatric:         Mood and Affect: Mood normal.         Thought Content: Thought content normal.         Judgment: Judgment normal.               Perimenopause  Favor abnormal bleeding from perimenopause.  Last ultrasound showed an unimpressive endometrial lining.           Screening for hematuria or proteinuria    Orders:    POC Urinalysis Dipstick    Abnormal uterine bleeding (AUB)  After 3-month interval of amenorrhea the patient had a normal 5-day period.  We discussed that the skips and periodic bleeding events may occur with perimenopause.          Seemingly transitioning into menopause with perimenopausal type bleeding.  Discussed follow-up for continued irregular bleeding with possible endometrial biopsy    No follow-ups on file.    Virgilio Orozco MD   7/16/2025 14:12 EDT  "

## (undated) DEVICE — JACKT LAB F/R KNIT CUFF/COLR XLG BLU

## (undated) DEVICE — FLEX ADVANTAGE 1500CC: Brand: FLEX ADVANTAGE

## (undated) DEVICE — BLCK/BITE BLOX W/DENTL/RIM W/STRAP 54F

## (undated) DEVICE — Device

## (undated) DEVICE — ADAPT CLN SCPE ENDO PORPOISE BX/50 DISP

## (undated) DEVICE — MSK ENDO PORT O2 POM ELITE CURAPLEX A/

## (undated) DEVICE — SINGLE-USE BIOPSY FORCEPS: Brand: RADIAL JAW 4

## (undated) DEVICE — KT ORCA ORCAPOD DISP STRL